# Patient Record
Sex: FEMALE | Race: WHITE | NOT HISPANIC OR LATINO | Employment: OTHER | URBAN - METROPOLITAN AREA
[De-identification: names, ages, dates, MRNs, and addresses within clinical notes are randomized per-mention and may not be internally consistent; named-entity substitution may affect disease eponyms.]

---

## 2017-01-01 ENCOUNTER — COMMUNICATION - HEALTHEAST (OUTPATIENT)
Dept: INTERNAL MEDICINE | Facility: CLINIC | Age: 67
End: 2017-01-01

## 2017-01-01 DIAGNOSIS — I10 HYPERTENSION: ICD-10-CM

## 2017-01-09 ENCOUNTER — RECORDS - HEALTHEAST (OUTPATIENT)
Dept: ADMINISTRATIVE | Facility: OTHER | Age: 67
End: 2017-01-09

## 2017-01-24 ENCOUNTER — RECORDS - HEALTHEAST (OUTPATIENT)
Dept: ADMINISTRATIVE | Facility: OTHER | Age: 67
End: 2017-01-24

## 2017-03-31 ENCOUNTER — COMMUNICATION - HEALTHEAST (OUTPATIENT)
Dept: INTERNAL MEDICINE | Facility: CLINIC | Age: 67
End: 2017-03-31

## 2017-03-31 DIAGNOSIS — I10 HYPERTENSION: ICD-10-CM

## 2017-05-01 ENCOUNTER — COMMUNICATION - HEALTHEAST (OUTPATIENT)
Dept: INTERNAL MEDICINE | Facility: CLINIC | Age: 67
End: 2017-05-01

## 2017-06-05 ENCOUNTER — OFFICE VISIT - HEALTHEAST (OUTPATIENT)
Dept: INTERNAL MEDICINE | Facility: CLINIC | Age: 67
End: 2017-06-05

## 2017-06-05 DIAGNOSIS — E83.119 HEMOCHROMATOSIS: ICD-10-CM

## 2017-06-05 DIAGNOSIS — E78.5 HYPERLIPIDEMIA: ICD-10-CM

## 2017-06-05 DIAGNOSIS — I10 HYPERTENSION: ICD-10-CM

## 2017-06-05 DIAGNOSIS — E11.9 DIABETES TYPE 2, CONTROLLED (H): ICD-10-CM

## 2017-06-05 DIAGNOSIS — D75.1 POLYCYTHEMIA: ICD-10-CM

## 2017-06-05 DIAGNOSIS — R60.9 EDEMA: ICD-10-CM

## 2017-06-05 DIAGNOSIS — S83.209A TORN MENISCUS: ICD-10-CM

## 2017-06-05 ASSESSMENT — MIFFLIN-ST. JEOR: SCORE: 1310.42

## 2017-06-06 ENCOUNTER — COMMUNICATION - HEALTHEAST (OUTPATIENT)
Dept: INTERNAL MEDICINE | Facility: CLINIC | Age: 67
End: 2017-06-06

## 2017-06-06 LAB — CHOLEST SERPL-MCNC: 150 MG/DL

## 2017-06-10 ENCOUNTER — COMMUNICATION - HEALTHEAST (OUTPATIENT)
Dept: INTERNAL MEDICINE | Facility: CLINIC | Age: 67
End: 2017-06-10

## 2017-06-27 ENCOUNTER — RECORDS - HEALTHEAST (OUTPATIENT)
Dept: ADMINISTRATIVE | Facility: OTHER | Age: 67
End: 2017-06-27

## 2017-07-12 ENCOUNTER — OFFICE VISIT - HEALTHEAST (OUTPATIENT)
Dept: FAMILY MEDICINE | Facility: CLINIC | Age: 67
End: 2017-07-12

## 2017-07-12 DIAGNOSIS — L03.011 CELLULITIS OF RIGHT MIDDLE FINGER: ICD-10-CM

## 2017-08-21 ENCOUNTER — OFFICE VISIT - HEALTHEAST (OUTPATIENT)
Dept: INTERNAL MEDICINE | Facility: CLINIC | Age: 67
End: 2017-08-21

## 2017-08-21 DIAGNOSIS — L03.011 PARONYCHIA OF FINGER, RIGHT: ICD-10-CM

## 2017-08-21 ASSESSMENT — MIFFLIN-ST. JEOR: SCORE: 1293.05

## 2017-11-03 ENCOUNTER — RECORDS - HEALTHEAST (OUTPATIENT)
Dept: ADMINISTRATIVE | Facility: OTHER | Age: 67
End: 2017-11-03

## 2017-11-28 ENCOUNTER — RECORDS - HEALTHEAST (OUTPATIENT)
Dept: ADMINISTRATIVE | Facility: OTHER | Age: 67
End: 2017-11-28

## 2018-02-08 ENCOUNTER — RECORDS - HEALTHEAST (OUTPATIENT)
Dept: ADMINISTRATIVE | Facility: OTHER | Age: 68
End: 2018-02-08

## 2018-03-15 ENCOUNTER — OFFICE VISIT - HEALTHEAST (OUTPATIENT)
Dept: INTERNAL MEDICINE | Facility: CLINIC | Age: 68
End: 2018-03-15

## 2018-03-15 ENCOUNTER — AMBULATORY - HEALTHEAST (OUTPATIENT)
Dept: INTERNAL MEDICINE | Facility: CLINIC | Age: 68
End: 2018-03-15

## 2018-03-15 DIAGNOSIS — E83.119 HEMOCHROMATOSIS: ICD-10-CM

## 2018-03-15 DIAGNOSIS — F41.9 CHRONIC ANXIETY: ICD-10-CM

## 2018-03-15 DIAGNOSIS — E11.9 DIABETES TYPE 2, CONTROLLED (H): ICD-10-CM

## 2018-03-15 DIAGNOSIS — R06.00 DYSPNEA: ICD-10-CM

## 2018-03-15 DIAGNOSIS — E78.5 HYPERLIPIDEMIA: ICD-10-CM

## 2018-03-15 DIAGNOSIS — I10 HYPERTENSION: ICD-10-CM

## 2018-03-15 LAB
ANION GAP SERPL CALCULATED.3IONS-SCNC: 15 MMOL/L (ref 5–18)
BNP SERPL-MCNC: 15 PG/ML (ref 0–117)
BUN SERPL-MCNC: 25 MG/DL (ref 8–22)
CALCIUM SERPL-MCNC: 10.3 MG/DL (ref 8.5–10.5)
CHLORIDE BLD-SCNC: 101 MMOL/L (ref 98–107)
CO2 SERPL-SCNC: 21 MMOL/L (ref 22–31)
CREAT SERPL-MCNC: 1.2 MG/DL (ref 0.6–1.1)
FERRITIN SERPL-MCNC: 63 NG/ML (ref 10–130)
GFR SERPL CREATININE-BSD FRML MDRD: 45 ML/MIN/1.73M2
GLUCOSE BLD-MCNC: 329 MG/DL (ref 70–125)
HBA1C MFR BLD: 9.7 % (ref 3.5–6)
POTASSIUM BLD-SCNC: 4.1 MMOL/L (ref 3.5–5)
SODIUM SERPL-SCNC: 137 MMOL/L (ref 136–145)
TSH SERPL DL<=0.005 MIU/L-ACNC: 1.26 UIU/ML (ref 0.3–5)

## 2018-03-15 ASSESSMENT — MIFFLIN-ST. JEOR: SCORE: 1270.19

## 2018-03-16 ENCOUNTER — COMMUNICATION - HEALTHEAST (OUTPATIENT)
Dept: INTERNAL MEDICINE | Facility: CLINIC | Age: 68
End: 2018-03-16

## 2018-03-21 ENCOUNTER — COMMUNICATION - HEALTHEAST (OUTPATIENT)
Dept: INTERNAL MEDICINE | Facility: CLINIC | Age: 68
End: 2018-03-21

## 2018-04-09 ENCOUNTER — HOSPITAL ENCOUNTER (OUTPATIENT)
Dept: NUCLEAR MEDICINE | Facility: CLINIC | Age: 68
Discharge: HOME | End: 2018-04-09
Attending: INTERNAL MEDICINE

## 2018-04-09 ENCOUNTER — COMMUNICATION - HEALTHEAST (OUTPATIENT)
Dept: INTERNAL MEDICINE | Facility: CLINIC | Age: 68
End: 2018-04-09

## 2018-04-09 ENCOUNTER — AMBULATORY - HEALTHEAST (OUTPATIENT)
Dept: INTERNAL MEDICINE | Facility: CLINIC | Age: 68
End: 2018-04-09

## 2018-04-09 ENCOUNTER — HOSPITAL ENCOUNTER (OUTPATIENT)
Dept: CARDIOLOGY | Facility: CLINIC | Age: 68
Discharge: HOME | End: 2018-04-09
Attending: INTERNAL MEDICINE

## 2018-04-09 ENCOUNTER — COMMUNICATION - HEALTHEAST (OUTPATIENT)
Dept: TELEHEALTH | Facility: CLINIC | Age: 68
End: 2018-04-09

## 2018-04-09 DIAGNOSIS — I20.9 ANGINA PECTORIS (H): ICD-10-CM

## 2018-04-09 DIAGNOSIS — R06.00 DYSPNEA: ICD-10-CM

## 2018-04-09 DIAGNOSIS — R94.39 ABNORMAL STRESS TEST: ICD-10-CM

## 2018-04-09 LAB
CV STRESS CURRENT BP HE: NORMAL
CV STRESS CURRENT HR HE: 110
CV STRESS CURRENT HR HE: 110
CV STRESS CURRENT HR HE: 111
CV STRESS CURRENT HR HE: 114
CV STRESS CURRENT HR HE: 115
CV STRESS CURRENT HR HE: 122
CV STRESS CURRENT HR HE: 123
CV STRESS CURRENT HR HE: 128
CV STRESS CURRENT HR HE: 134
CV STRESS CURRENT HR HE: 135
CV STRESS CURRENT HR HE: 137
CV STRESS CURRENT HR HE: 137
CV STRESS CURRENT HR HE: 139
CV STRESS CURRENT HR HE: 74
CV STRESS CURRENT HR HE: 74
CV STRESS CURRENT HR HE: 75
CV STRESS CURRENT HR HE: 76
CV STRESS CURRENT HR HE: 76
CV STRESS CURRENT HR HE: 78
CV STRESS CURRENT HR HE: 78
CV STRESS CURRENT HR HE: 79
CV STRESS CURRENT HR HE: 80
CV STRESS CURRENT HR HE: 87
CV STRESS CURRENT HR HE: 89
CV STRESS CURRENT HR HE: 98
CV STRESS DEVIATION TIME HE: NORMAL
CV STRESS ECHO PERCENT HR HE: NORMAL
CV STRESS EXERCISE STAGE HE: NORMAL
CV STRESS EXERCISE STAGE REACHED HE: NORMAL
CV STRESS FINAL RESTING BP HE: NORMAL
CV STRESS FINAL RESTING HR HE: 74
CV STRESS MAX HR HE: 139
CV STRESS MAX TREADMILL GRADE HE: 12
CV STRESS MAX TREADMILL SPEED HE: 2.5
CV STRESS PEAK DIA BP HE: NORMAL
CV STRESS PEAK SYS BP HE: NORMAL
CV STRESS PHASE HE: NORMAL
CV STRESS PROTOCOL HE: NORMAL
CV STRESS RESTING PT POSITION HE: NORMAL
CV STRESS RESTING PT POSITION HE: NORMAL
CV STRESS ST DEVIATION AMOUNT HE: NORMAL
CV STRESS ST DEVIATION ELEVATION HE: NORMAL
CV STRESS ST EVELATION AMOUNT HE: NORMAL
CV STRESS TEST TYPE HE: NORMAL
CV STRESS TOTAL STAGE TIME MIN 1 HE: NORMAL
NUC STRESS EJECTION FRACTION: 70 %
STRESS ECHO BASELINE BP: NORMAL
STRESS ECHO BASELINE HR: 75
STRESS ECHO CALCULATED PERCENT HR: 93 %
STRESS ECHO LAST STRESS BP: NORMAL
STRESS ECHO LAST STRESS HR: 137
STRESS ECHO POST ESTIMATED WORKLOAD: 7.1
STRESS ECHO POST EXERCISE DUR MIN: 5
STRESS ECHO POST EXERCISE DUR SEC: 59
STRESS ECHO TARGET HR: 128

## 2018-04-10 ENCOUNTER — COMMUNICATION - HEALTHEAST (OUTPATIENT)
Dept: INTERNAL MEDICINE | Facility: CLINIC | Age: 68
End: 2018-04-10

## 2018-04-10 DIAGNOSIS — F41.9 CHRONIC ANXIETY: ICD-10-CM

## 2018-04-17 ENCOUNTER — OFFICE VISIT - HEALTHEAST (OUTPATIENT)
Dept: CARDIOLOGY | Facility: CLINIC | Age: 68
End: 2018-04-17

## 2018-04-17 DIAGNOSIS — R06.09 DYSPNEA ON EXERTION: ICD-10-CM

## 2018-04-17 DIAGNOSIS — E11.9 DIABETES (H): ICD-10-CM

## 2018-04-17 DIAGNOSIS — R94.39 ABNORMAL NUCLEAR STRESS TEST: ICD-10-CM

## 2018-04-17 DIAGNOSIS — E78.5 DYSLIPIDEMIA, GOAL LDL BELOW 100: ICD-10-CM

## 2018-04-17 DIAGNOSIS — I10 ESSENTIAL HYPERTENSION: ICD-10-CM

## 2018-04-17 ASSESSMENT — MIFFLIN-ST. JEOR: SCORE: 1283.79

## 2018-04-26 ENCOUNTER — COMMUNICATION - HEALTHEAST (OUTPATIENT)
Dept: CARDIOLOGY | Facility: CLINIC | Age: 68
End: 2018-04-26

## 2018-05-01 ENCOUNTER — HOSPITAL ENCOUNTER (OUTPATIENT)
Dept: CT IMAGING | Facility: CLINIC | Age: 68
Discharge: HOME | End: 2018-05-01
Attending: INTERNAL MEDICINE

## 2018-05-01 DIAGNOSIS — R06.09 DYSPNEA ON EXERTION: ICD-10-CM

## 2018-05-01 DIAGNOSIS — R06.09 OTHER FORMS OF DYSPNEA: ICD-10-CM

## 2018-05-01 DIAGNOSIS — R94.39 ABNORMAL NUCLEAR STRESS TEST: ICD-10-CM

## 2018-05-01 LAB
CREAT BLD-MCNC: 0.9 MG/DL
POC GFR AMER AF HE - HISTORICAL: >60 ML/MIN/1.73M2
POC GFR NON AMER AF HE - HISTORICAL: >60 ML/MIN/1.73M2

## 2018-05-01 RX ORDER — IBUPROFEN 200 MG
200 TABLET ORAL EVERY 6 HOURS PRN
Status: SHIPPED | COMMUNITY
Start: 2018-05-01

## 2018-05-01 ASSESSMENT — MIFFLIN-ST. JEOR: SCORE: 1270.19

## 2018-05-12 ENCOUNTER — COMMUNICATION - HEALTHEAST (OUTPATIENT)
Dept: INTERNAL MEDICINE | Facility: CLINIC | Age: 68
End: 2018-05-12

## 2018-05-12 DIAGNOSIS — F41.9 CHRONIC ANXIETY: ICD-10-CM

## 2018-05-14 ENCOUNTER — RECORDS - HEALTHEAST (OUTPATIENT)
Dept: ADMINISTRATIVE | Facility: OTHER | Age: 68
End: 2018-05-14

## 2018-05-24 ENCOUNTER — OFFICE VISIT - HEALTHEAST (OUTPATIENT)
Dept: CARDIOLOGY | Facility: CLINIC | Age: 68
End: 2018-05-24

## 2018-05-24 DIAGNOSIS — E78.5 DYSLIPIDEMIA, GOAL LDL BELOW 100: ICD-10-CM

## 2018-05-24 DIAGNOSIS — I10 ESSENTIAL HYPERTENSION: ICD-10-CM

## 2018-05-24 DIAGNOSIS — I25.10 CORONARY ARTERY DISEASE INVOLVING NATIVE CORONARY ARTERY OF NATIVE HEART WITHOUT ANGINA PECTORIS: ICD-10-CM

## 2018-05-24 ASSESSMENT — MIFFLIN-ST. JEOR: SCORE: 1265.65

## 2018-06-08 ENCOUNTER — AMBULATORY - HEALTHEAST (OUTPATIENT)
Dept: CARDIOLOGY | Facility: CLINIC | Age: 68
End: 2018-06-08

## 2018-06-08 DIAGNOSIS — I25.10 CORONARY ARTERY DISEASE INVOLVING NATIVE CORONARY ARTERY OF NATIVE HEART WITHOUT ANGINA PECTORIS: ICD-10-CM

## 2018-06-08 LAB
ALBUMIN SERPL-MCNC: 4.1 G/DL (ref 3.5–5)
ALP SERPL-CCNC: 52 U/L (ref 45–120)
ALT SERPL W P-5'-P-CCNC: 18 U/L (ref 0–45)
AST SERPL W P-5'-P-CCNC: 16 U/L (ref 0–40)
BILIRUB DIRECT SERPL-MCNC: 0.2 MG/DL
BILIRUB SERPL-MCNC: 0.6 MG/DL (ref 0–1)
CHOLEST SERPL-MCNC: 152 MG/DL
FASTING STATUS PATIENT QL REPORTED: YES
HDLC SERPL-MCNC: 46 MG/DL
LDLC SERPL CALC-MCNC: 57 MG/DL
PROT SERPL-MCNC: 8 G/DL (ref 6–8)
TRIGL SERPL-MCNC: 246 MG/DL

## 2018-06-15 ENCOUNTER — COMMUNICATION - HEALTHEAST (OUTPATIENT)
Dept: CARDIOLOGY | Facility: CLINIC | Age: 68
End: 2018-06-15

## 2018-06-28 ENCOUNTER — COMMUNICATION - HEALTHEAST (OUTPATIENT)
Dept: INTERNAL MEDICINE | Facility: CLINIC | Age: 68
End: 2018-06-28

## 2018-06-28 DIAGNOSIS — F41.9 CHRONIC ANXIETY: ICD-10-CM

## 2018-06-28 DIAGNOSIS — I10 HYPERTENSION: ICD-10-CM

## 2018-07-31 ENCOUNTER — COMMUNICATION - HEALTHEAST (OUTPATIENT)
Dept: INTERNAL MEDICINE | Facility: CLINIC | Age: 68
End: 2018-07-31

## 2018-07-31 DIAGNOSIS — F41.9 CHRONIC ANXIETY: ICD-10-CM

## 2018-08-15 ENCOUNTER — COMMUNICATION - HEALTHEAST (OUTPATIENT)
Dept: INTERNAL MEDICINE | Facility: CLINIC | Age: 68
End: 2018-08-15

## 2018-08-15 DIAGNOSIS — E78.5 DYSLIPIDEMIA, GOAL LDL BELOW 100: ICD-10-CM

## 2018-08-15 DIAGNOSIS — I10 HYPERTENSION: ICD-10-CM

## 2018-08-27 ENCOUNTER — RECORDS - HEALTHEAST (OUTPATIENT)
Dept: ADMINISTRATIVE | Facility: OTHER | Age: 68
End: 2018-08-27

## 2018-09-07 ENCOUNTER — COMMUNICATION - HEALTHEAST (OUTPATIENT)
Dept: INTERNAL MEDICINE | Facility: CLINIC | Age: 68
End: 2018-09-07

## 2018-09-07 DIAGNOSIS — F41.9 CHRONIC ANXIETY: ICD-10-CM

## 2018-09-14 ENCOUNTER — RECORDS - HEALTHEAST (OUTPATIENT)
Dept: ADMINISTRATIVE | Facility: OTHER | Age: 68
End: 2018-09-14

## 2018-10-19 ENCOUNTER — COMMUNICATION - HEALTHEAST (OUTPATIENT)
Dept: INTERNAL MEDICINE | Facility: CLINIC | Age: 68
End: 2018-10-19

## 2018-10-19 DIAGNOSIS — F41.9 CHRONIC ANXIETY: ICD-10-CM

## 2018-11-14 ENCOUNTER — OFFICE VISIT - HEALTHEAST (OUTPATIENT)
Dept: CARDIOLOGY | Facility: CLINIC | Age: 68
End: 2018-11-14

## 2018-11-14 DIAGNOSIS — I25.10 CORONARY ARTERY DISEASE INVOLVING NATIVE CORONARY ARTERY OF NATIVE HEART WITHOUT ANGINA PECTORIS: ICD-10-CM

## 2018-11-14 DIAGNOSIS — E78.5 DYSLIPIDEMIA, GOAL LDL BELOW 100: ICD-10-CM

## 2018-11-14 DIAGNOSIS — I10 ESSENTIAL HYPERTENSION: ICD-10-CM

## 2018-11-14 ASSESSMENT — MIFFLIN-ST. JEOR: SCORE: 1270.19

## 2018-11-20 ENCOUNTER — COMMUNICATION - HEALTHEAST (OUTPATIENT)
Dept: INTERNAL MEDICINE | Facility: CLINIC | Age: 68
End: 2018-11-20

## 2018-11-20 DIAGNOSIS — F41.9 CHRONIC ANXIETY: ICD-10-CM

## 2018-12-23 ENCOUNTER — COMMUNICATION - HEALTHEAST (OUTPATIENT)
Dept: INTERNAL MEDICINE | Facility: CLINIC | Age: 68
End: 2018-12-23

## 2018-12-23 DIAGNOSIS — F41.9 CHRONIC ANXIETY: ICD-10-CM

## 2019-01-23 ENCOUNTER — COMMUNICATION - HEALTHEAST (OUTPATIENT)
Dept: INTERNAL MEDICINE | Facility: CLINIC | Age: 69
End: 2019-01-23

## 2019-01-23 DIAGNOSIS — F41.9 CHRONIC ANXIETY: ICD-10-CM

## 2019-01-25 ENCOUNTER — COMMUNICATION - HEALTHEAST (OUTPATIENT)
Dept: INTERNAL MEDICINE | Facility: CLINIC | Age: 69
End: 2019-01-25

## 2019-01-25 DIAGNOSIS — F41.9 CHRONIC ANXIETY: ICD-10-CM

## 2019-02-25 ENCOUNTER — COMMUNICATION - HEALTHEAST (OUTPATIENT)
Dept: INTERNAL MEDICINE | Facility: CLINIC | Age: 69
End: 2019-02-25

## 2019-02-25 DIAGNOSIS — E78.5 DYSLIPIDEMIA, GOAL LDL BELOW 100: ICD-10-CM

## 2019-02-25 DIAGNOSIS — I10 HYPERTENSION: ICD-10-CM

## 2019-02-28 ENCOUNTER — COMMUNICATION - HEALTHEAST (OUTPATIENT)
Dept: INTERNAL MEDICINE | Facility: CLINIC | Age: 69
End: 2019-02-28

## 2019-02-28 DIAGNOSIS — F41.9 CHRONIC ANXIETY: ICD-10-CM

## 2019-03-05 ENCOUNTER — COMMUNICATION - HEALTHEAST (OUTPATIENT)
Dept: INTERNAL MEDICINE | Facility: CLINIC | Age: 69
End: 2019-03-05

## 2019-03-05 DIAGNOSIS — F41.9 CHRONIC ANXIETY: ICD-10-CM

## 2019-03-15 ENCOUNTER — COMMUNICATION - HEALTHEAST (OUTPATIENT)
Dept: INTERNAL MEDICINE | Facility: CLINIC | Age: 69
End: 2019-03-15

## 2019-03-15 DIAGNOSIS — F41.9 CHRONIC ANXIETY: ICD-10-CM

## 2019-03-28 ENCOUNTER — OFFICE VISIT - HEALTHEAST (OUTPATIENT)
Dept: INTERNAL MEDICINE | Facility: CLINIC | Age: 69
End: 2019-03-28

## 2019-03-28 DIAGNOSIS — Z12.11 SCREEN FOR COLON CANCER: ICD-10-CM

## 2019-03-28 DIAGNOSIS — E83.110 HEREDITARY HEMOCHROMATOSIS (H): ICD-10-CM

## 2019-03-28 DIAGNOSIS — I25.10 CORONARY ARTERY DISEASE INVOLVING NATIVE CORONARY ARTERY OF NATIVE HEART WITHOUT ANGINA PECTORIS: ICD-10-CM

## 2019-03-28 DIAGNOSIS — E11.9 TYPE 2 DIABETES MELLITUS WITHOUT COMPLICATION, WITHOUT LONG-TERM CURRENT USE OF INSULIN (H): ICD-10-CM

## 2019-03-28 DIAGNOSIS — F41.9 CHRONIC ANXIETY: ICD-10-CM

## 2019-03-28 DIAGNOSIS — Z12.31 VISIT FOR SCREENING MAMMOGRAM: ICD-10-CM

## 2019-03-28 LAB
ALBUMIN SERPL-MCNC: 4.6 G/DL (ref 3.5–5)
ALBUMIN UR-MCNC: NEGATIVE MG/DL
ALP SERPL-CCNC: 50 U/L (ref 45–120)
ALT SERPL W P-5'-P-CCNC: 20 U/L (ref 0–45)
ANION GAP SERPL CALCULATED.3IONS-SCNC: 12 MMOL/L (ref 5–18)
APPEARANCE UR: CLEAR
AST SERPL W P-5'-P-CCNC: 12 U/L (ref 0–40)
BACTERIA #/AREA URNS HPF: ABNORMAL HPF
BASOPHILS # BLD AUTO: 0.1 THOU/UL (ref 0–0.2)
BASOPHILS NFR BLD AUTO: 1 % (ref 0–2)
BILIRUB DIRECT SERPL-MCNC: 0.2 MG/DL
BILIRUB SERPL-MCNC: 0.7 MG/DL (ref 0–1)
BILIRUB UR QL STRIP: NEGATIVE
BUN SERPL-MCNC: 24 MG/DL (ref 8–28)
CALCIUM SERPL-MCNC: 10.5 MG/DL (ref 8.5–10.5)
CHLORIDE BLD-SCNC: 102 MMOL/L (ref 98–107)
CO2 SERPL-SCNC: 26 MMOL/L (ref 22–31)
COLOR UR AUTO: YELLOW
CREAT SERPL-MCNC: 1.11 MG/DL (ref 0.6–1.1)
CREAT UR-MCNC: 89.5 MG/DL
EOSINOPHIL # BLD AUTO: 0.2 THOU/UL (ref 0–0.4)
EOSINOPHIL NFR BLD AUTO: 3 % (ref 0–6)
ERYTHROCYTE [DISTWIDTH] IN BLOOD BY AUTOMATED COUNT: 11.5 % (ref 11–14.5)
ERYTHROCYTE [SEDIMENTATION RATE] IN BLOOD BY WESTERGREN METHOD: 8 MM/HR (ref 0–20)
FERRITIN SERPL-MCNC: 48 NG/ML (ref 10–130)
GFR SERPL CREATININE-BSD FRML MDRD: 48 ML/MIN/1.73M2
GLUCOSE BLD-MCNC: 292 MG/DL (ref 70–125)
GLUCOSE UR STRIP-MCNC: ABNORMAL MG/DL
HCT VFR BLD AUTO: 44.3 % (ref 35–47)
HGB BLD-MCNC: 15.6 G/DL (ref 12–16)
HGB UR QL STRIP: NEGATIVE
IRON SATN MFR SERPL: 46 % (ref 20–50)
IRON SERPL-MCNC: 159 UG/DL (ref 42–175)
KETONES UR STRIP-MCNC: ABNORMAL MG/DL
LEUKOCYTE ESTERASE UR QL STRIP: ABNORMAL
LYMPHOCYTES # BLD AUTO: 2.2 THOU/UL (ref 0.8–4.4)
LYMPHOCYTES NFR BLD AUTO: 37 % (ref 20–40)
MCH RBC QN AUTO: 33.1 PG (ref 27–34)
MCHC RBC AUTO-ENTMCNC: 35.2 G/DL (ref 32–36)
MCV RBC AUTO: 94 FL (ref 80–100)
MICROALBUMIN UR-MCNC: 1.26 MG/DL (ref 0–1.99)
MICROALBUMIN/CREAT UR: 14.1 MG/G
MONOCYTES # BLD AUTO: 0.5 THOU/UL (ref 0–0.9)
MONOCYTES NFR BLD AUTO: 8 % (ref 2–10)
NEUTROPHILS # BLD AUTO: 3.1 THOU/UL (ref 2–7.7)
NEUTROPHILS NFR BLD AUTO: 52 % (ref 50–70)
NITRATE UR QL: NEGATIVE
PH UR STRIP: 5.5 [PH] (ref 5–8)
PLATELET # BLD AUTO: 236 THOU/UL (ref 140–440)
PMV BLD AUTO: 8.5 FL (ref 7–10)
POTASSIUM BLD-SCNC: 4.5 MMOL/L (ref 3.5–5)
PROT SERPL-MCNC: 7.9 G/DL (ref 6–8)
RBC # BLD AUTO: 4.71 MILL/UL (ref 3.8–5.4)
RBC #/AREA URNS AUTO: ABNORMAL HPF
SODIUM SERPL-SCNC: 140 MMOL/L (ref 136–145)
SP GR UR STRIP: 1.02 (ref 1–1.03)
SQUAMOUS #/AREA URNS AUTO: ABNORMAL LPF
TIBC SERPL-MCNC: 342 UG/DL (ref 313–563)
TRANSFERRIN SERPL-MCNC: 273 MG/DL (ref 212–360)
TSH SERPL DL<=0.005 MIU/L-ACNC: 1.18 UIU/ML (ref 0.3–5)
UROBILINOGEN UR STRIP-ACNC: ABNORMAL
WBC #/AREA URNS AUTO: ABNORMAL HPF
WBC: 5.9 THOU/UL (ref 4–11)

## 2019-03-28 ASSESSMENT — MIFFLIN-ST. JEOR: SCORE: 1274.9

## 2019-03-29 ENCOUNTER — AMBULATORY - HEALTHEAST (OUTPATIENT)
Dept: INTERNAL MEDICINE | Facility: CLINIC | Age: 69
End: 2019-03-29

## 2019-03-29 ENCOUNTER — COMMUNICATION - HEALTHEAST (OUTPATIENT)
Dept: INTERNAL MEDICINE | Facility: CLINIC | Age: 69
End: 2019-03-29

## 2019-03-29 DIAGNOSIS — E11.8 TYPE 2 DIABETES MELLITUS WITH COMPLICATION, WITHOUT LONG-TERM CURRENT USE OF INSULIN (H): ICD-10-CM

## 2019-03-29 LAB — BACTERIA SPEC CULT: NO GROWTH

## 2019-04-05 ENCOUNTER — COMMUNICATION - HEALTHEAST (OUTPATIENT)
Dept: INTERNAL MEDICINE | Facility: CLINIC | Age: 69
End: 2019-04-05

## 2019-04-05 DIAGNOSIS — I10 HYPERTENSION: ICD-10-CM

## 2019-05-23 ENCOUNTER — RECORDS - HEALTHEAST (OUTPATIENT)
Dept: ADMINISTRATIVE | Facility: OTHER | Age: 69
End: 2019-05-23

## 2019-06-10 ENCOUNTER — RECORDS - HEALTHEAST (OUTPATIENT)
Dept: ADMINISTRATIVE | Facility: OTHER | Age: 69
End: 2019-06-10

## 2019-06-14 ENCOUNTER — RECORDS - HEALTHEAST (OUTPATIENT)
Dept: ADMINISTRATIVE | Facility: OTHER | Age: 69
End: 2019-06-14

## 2019-06-21 ENCOUNTER — RECORDS - HEALTHEAST (OUTPATIENT)
Dept: HEALTH INFORMATION MANAGEMENT | Facility: CLINIC | Age: 69
End: 2019-06-21

## 2019-08-26 ENCOUNTER — RECORDS - HEALTHEAST (OUTPATIENT)
Dept: ADMINISTRATIVE | Facility: OTHER | Age: 69
End: 2019-08-26

## 2019-10-01 ENCOUNTER — COMMUNICATION - HEALTHEAST (OUTPATIENT)
Dept: INTERNAL MEDICINE | Facility: CLINIC | Age: 69
End: 2019-10-01

## 2019-10-01 DIAGNOSIS — F41.9 CHRONIC ANXIETY: ICD-10-CM

## 2019-10-07 ENCOUNTER — RECORDS - HEALTHEAST (OUTPATIENT)
Dept: ADMINISTRATIVE | Facility: OTHER | Age: 69
End: 2019-10-07

## 2019-10-11 ENCOUNTER — COMMUNICATION - HEALTHEAST (OUTPATIENT)
Dept: INTERNAL MEDICINE | Facility: CLINIC | Age: 69
End: 2019-10-11

## 2019-10-28 ENCOUNTER — OFFICE VISIT - HEALTHEAST (OUTPATIENT)
Dept: INTERNAL MEDICINE | Facility: CLINIC | Age: 69
End: 2019-10-28

## 2019-10-28 ENCOUNTER — RECORDS - HEALTHEAST (OUTPATIENT)
Dept: ADMINISTRATIVE | Facility: OTHER | Age: 69
End: 2019-10-28

## 2019-10-28 DIAGNOSIS — M54.9 BACK PAIN: ICD-10-CM

## 2019-10-28 DIAGNOSIS — Z01.818 PREOPERATIVE EXAMINATION: ICD-10-CM

## 2019-10-28 DIAGNOSIS — N18.30 CKD (CHRONIC KIDNEY DISEASE) STAGE 3, GFR 30-59 ML/MIN (H): ICD-10-CM

## 2019-10-28 LAB
ANION GAP SERPL CALCULATED.3IONS-SCNC: 10 MMOL/L (ref 5–18)
BUN SERPL-MCNC: 21 MG/DL (ref 8–28)
CALCIUM SERPL-MCNC: 10 MG/DL (ref 8.5–10.5)
CHLORIDE BLD-SCNC: 101 MMOL/L (ref 98–107)
CO2 SERPL-SCNC: 27 MMOL/L (ref 22–31)
CREAT SERPL-MCNC: 1.03 MG/DL (ref 0.6–1.1)
GFR SERPL CREATININE-BSD FRML MDRD: 53 ML/MIN/1.73M2
GLUCOSE BLD-MCNC: 276 MG/DL (ref 70–125)
POTASSIUM BLD-SCNC: 4.5 MMOL/L (ref 3.5–5)
SODIUM SERPL-SCNC: 138 MMOL/L (ref 136–145)

## 2019-10-29 ENCOUNTER — COMMUNICATION - HEALTHEAST (OUTPATIENT)
Dept: INTERNAL MEDICINE | Facility: CLINIC | Age: 69
End: 2019-10-29

## 2019-11-04 ENCOUNTER — COMMUNICATION - HEALTHEAST (OUTPATIENT)
Dept: INTERNAL MEDICINE | Facility: CLINIC | Age: 69
End: 2019-11-04

## 2019-11-04 ENCOUNTER — RECORDS - HEALTHEAST (OUTPATIENT)
Dept: INTERNAL MEDICINE | Facility: CLINIC | Age: 69
End: 2019-11-04

## 2019-11-08 ENCOUNTER — RECORDS - HEALTHEAST (OUTPATIENT)
Dept: ADMINISTRATIVE | Facility: OTHER | Age: 69
End: 2019-11-08

## 2019-11-25 ENCOUNTER — RECORDS - HEALTHEAST (OUTPATIENT)
Dept: ADMINISTRATIVE | Facility: OTHER | Age: 69
End: 2019-11-25

## 2019-12-11 ENCOUNTER — RECORDS - HEALTHEAST (OUTPATIENT)
Dept: ADMINISTRATIVE | Facility: OTHER | Age: 69
End: 2019-12-11

## 2020-01-06 ENCOUNTER — OFFICE VISIT - HEALTHEAST (OUTPATIENT)
Dept: INTERNAL MEDICINE | Facility: CLINIC | Age: 70
End: 2020-01-06

## 2020-01-06 DIAGNOSIS — M54.50 LUMBAR BACK PAIN: ICD-10-CM

## 2020-01-06 DIAGNOSIS — Z78.0 POSTMENOPAUSAL: ICD-10-CM

## 2020-01-08 ENCOUNTER — COMMUNICATION - HEALTHEAST (OUTPATIENT)
Dept: INTERNAL MEDICINE | Facility: CLINIC | Age: 70
End: 2020-01-08

## 2020-01-08 DIAGNOSIS — I10 ESSENTIAL HYPERTENSION: ICD-10-CM

## 2020-01-09 ENCOUNTER — RECORDS - HEALTHEAST (OUTPATIENT)
Dept: ADMINISTRATIVE | Facility: OTHER | Age: 70
End: 2020-01-09

## 2020-01-27 ENCOUNTER — RECORDS - HEALTHEAST (OUTPATIENT)
Dept: ADMINISTRATIVE | Facility: OTHER | Age: 70
End: 2020-01-27

## 2020-01-27 ENCOUNTER — RECORDS - HEALTHEAST (OUTPATIENT)
Dept: BONE DENSITY | Facility: CLINIC | Age: 70
End: 2020-01-27

## 2020-01-27 DIAGNOSIS — Z78.0 ASYMPTOMATIC MENOPAUSAL STATE: ICD-10-CM

## 2020-01-30 ENCOUNTER — COMMUNICATION - HEALTHEAST (OUTPATIENT)
Dept: INTERNAL MEDICINE | Facility: CLINIC | Age: 70
End: 2020-01-30

## 2020-02-07 ENCOUNTER — COMMUNICATION - HEALTHEAST (OUTPATIENT)
Dept: INTERNAL MEDICINE | Facility: CLINIC | Age: 70
End: 2020-02-07

## 2020-02-11 ENCOUNTER — OFFICE VISIT - HEALTHEAST (OUTPATIENT)
Dept: INTERNAL MEDICINE | Facility: CLINIC | Age: 70
End: 2020-02-11

## 2020-02-11 DIAGNOSIS — E83.119 HEMOCHROMATOSIS, UNSPECIFIED HEMOCHROMATOSIS TYPE: ICD-10-CM

## 2020-02-11 DIAGNOSIS — H25.9 SENILE CATARACT OF LEFT EYE, UNSPECIFIED AGE-RELATED CATARACT TYPE: ICD-10-CM

## 2020-02-11 DIAGNOSIS — Z01.818 PREOP GENERAL PHYSICAL EXAM: ICD-10-CM

## 2020-02-11 DIAGNOSIS — E11.9 TYPE 2 DIABETES MELLITUS WITHOUT COMPLICATION, WITHOUT LONG-TERM CURRENT USE OF INSULIN (H): ICD-10-CM

## 2020-02-11 DIAGNOSIS — Z78.0 POSTMENOPAUSAL: ICD-10-CM

## 2020-02-11 DIAGNOSIS — E78.5 DYSLIPIDEMIA, GOAL LDL BELOW 100: ICD-10-CM

## 2020-02-11 DIAGNOSIS — I25.10 CORONARY ARTERY DISEASE INVOLVING NATIVE CORONARY ARTERY OF NATIVE HEART WITHOUT ANGINA PECTORIS: ICD-10-CM

## 2020-02-11 DIAGNOSIS — I10 ESSENTIAL HYPERTENSION: ICD-10-CM

## 2020-02-11 DIAGNOSIS — E88.810 METABOLIC SYNDROME: ICD-10-CM

## 2020-02-11 LAB
ALBUMIN SERPL-MCNC: 4.3 G/DL (ref 3.5–5)
ALP SERPL-CCNC: 46 U/L (ref 45–120)
ALT SERPL W P-5'-P-CCNC: 20 U/L (ref 0–45)
ANION GAP SERPL CALCULATED.3IONS-SCNC: 11 MMOL/L (ref 5–18)
AST SERPL W P-5'-P-CCNC: 13 U/L (ref 0–40)
BILIRUB SERPL-MCNC: 0.8 MG/DL (ref 0–1)
BUN SERPL-MCNC: 21 MG/DL (ref 8–28)
CALCIUM SERPL-MCNC: 10.1 MG/DL (ref 8.5–10.5)
CHLORIDE BLD-SCNC: 101 MMOL/L (ref 98–107)
CO2 SERPL-SCNC: 28 MMOL/L (ref 22–31)
CREAT SERPL-MCNC: 1.09 MG/DL (ref 0.6–1.1)
ERYTHROCYTE [DISTWIDTH] IN BLOOD BY AUTOMATED COUNT: 11.7 % (ref 11–14.5)
FERRITIN SERPL-MCNC: 47 NG/ML (ref 10–130)
GFR SERPL CREATININE-BSD FRML MDRD: 49 ML/MIN/1.73M2
GLUCOSE BLD-MCNC: 165 MG/DL (ref 70–125)
HCT VFR BLD AUTO: 44.3 % (ref 35–47)
HGB BLD-MCNC: 14.7 G/DL (ref 12–16)
MCH RBC QN AUTO: 33.1 PG (ref 27–34)
MCHC RBC AUTO-ENTMCNC: 33.3 G/DL (ref 32–36)
MCV RBC AUTO: 99 FL (ref 80–100)
PLATELET # BLD AUTO: 258 THOU/UL (ref 140–440)
PMV BLD AUTO: 7.8 FL (ref 7–10)
POTASSIUM BLD-SCNC: 4.6 MMOL/L (ref 3.5–5)
PROT SERPL-MCNC: 7.1 G/DL (ref 6–8)
RBC # BLD AUTO: 4.46 MILL/UL (ref 3.8–5.4)
SODIUM SERPL-SCNC: 140 MMOL/L (ref 136–145)
WBC: 6.4 THOU/UL (ref 4–11)

## 2020-02-11 RX ORDER — PIOGLITAZONEHYDROCHLORIDE 15 MG/1
15 TABLET ORAL DAILY
Status: SHIPPED | COMMUNITY
Start: 2020-02-04

## 2020-02-12 ENCOUNTER — COMMUNICATION - HEALTHEAST (OUTPATIENT)
Dept: INTERNAL MEDICINE | Facility: CLINIC | Age: 70
End: 2020-02-12

## 2020-02-24 ENCOUNTER — COMMUNICATION - HEALTHEAST (OUTPATIENT)
Dept: INTERNAL MEDICINE | Facility: CLINIC | Age: 70
End: 2020-02-24

## 2020-02-24 DIAGNOSIS — E78.5 DYSLIPIDEMIA, GOAL LDL BELOW 100: ICD-10-CM

## 2020-02-24 DIAGNOSIS — I10 HYPERTENSION: ICD-10-CM

## 2020-04-30 ENCOUNTER — RECORDS - HEALTHEAST (OUTPATIENT)
Dept: ADMINISTRATIVE | Facility: OTHER | Age: 70
End: 2020-04-30

## 2020-05-01 ENCOUNTER — COMMUNICATION - HEALTHEAST (OUTPATIENT)
Dept: INTERNAL MEDICINE | Facility: CLINIC | Age: 70
End: 2020-05-01

## 2020-05-01 DIAGNOSIS — F41.9 CHRONIC ANXIETY: ICD-10-CM

## 2020-05-18 ENCOUNTER — COMMUNICATION - HEALTHEAST (OUTPATIENT)
Dept: INTERNAL MEDICINE | Facility: CLINIC | Age: 70
End: 2020-05-18

## 2020-05-18 DIAGNOSIS — E78.5 DYSLIPIDEMIA, GOAL LDL BELOW 100: ICD-10-CM

## 2020-06-01 ENCOUNTER — RECORDS - HEALTHEAST (OUTPATIENT)
Dept: ADMINISTRATIVE | Facility: OTHER | Age: 70
End: 2020-06-01

## 2020-06-04 ENCOUNTER — COMMUNICATION - HEALTHEAST (OUTPATIENT)
Dept: INTERNAL MEDICINE | Facility: CLINIC | Age: 70
End: 2020-06-04

## 2020-06-04 DIAGNOSIS — I10 HYPERTENSION: ICD-10-CM

## 2020-06-30 ENCOUNTER — COMMUNICATION - HEALTHEAST (OUTPATIENT)
Dept: SCHEDULING | Facility: CLINIC | Age: 70
End: 2020-06-30

## 2020-06-30 ENCOUNTER — OFFICE VISIT - HEALTHEAST (OUTPATIENT)
Dept: INTERNAL MEDICINE | Facility: CLINIC | Age: 70
End: 2020-06-30

## 2020-06-30 DIAGNOSIS — S91.001A OPEN WOUND OF RIGHT KNEE, LEG, AND ANKLE, INITIAL ENCOUNTER: ICD-10-CM

## 2020-06-30 DIAGNOSIS — S81.801A OPEN WOUND OF RIGHT KNEE, LEG, AND ANKLE, INITIAL ENCOUNTER: ICD-10-CM

## 2020-06-30 DIAGNOSIS — E11.9 TYPE 2 DIABETES MELLITUS WITHOUT COMPLICATION, WITHOUT LONG-TERM CURRENT USE OF INSULIN (H): ICD-10-CM

## 2020-06-30 DIAGNOSIS — S81.001A OPEN WOUND OF RIGHT KNEE, LEG, AND ANKLE, INITIAL ENCOUNTER: ICD-10-CM

## 2020-06-30 DIAGNOSIS — I10 ESSENTIAL HYPERTENSION: ICD-10-CM

## 2020-07-14 ENCOUNTER — OFFICE VISIT - HEALTHEAST (OUTPATIENT)
Dept: INTERNAL MEDICINE | Facility: CLINIC | Age: 70
End: 2020-07-14

## 2020-07-14 DIAGNOSIS — M17.0 PRIMARY OSTEOARTHRITIS OF BOTH KNEES: ICD-10-CM

## 2020-07-14 DIAGNOSIS — R25.2 MUSCLE CRAMPS: ICD-10-CM

## 2020-07-14 DIAGNOSIS — Z12.31 VISIT FOR SCREENING MAMMOGRAM: ICD-10-CM

## 2020-07-14 DIAGNOSIS — Z78.0 POSTMENOPAUSAL: ICD-10-CM

## 2020-07-14 DIAGNOSIS — I10 ESSENTIAL HYPERTENSION: ICD-10-CM

## 2020-07-14 DIAGNOSIS — E11.9 TYPE 2 DIABETES MELLITUS WITHOUT COMPLICATION, WITHOUT LONG-TERM CURRENT USE OF INSULIN (H): ICD-10-CM

## 2020-07-14 DIAGNOSIS — Z00.00 ROUTINE GENERAL MEDICAL EXAMINATION AT A HEALTH CARE FACILITY: ICD-10-CM

## 2020-07-14 DIAGNOSIS — I25.10 CORONARY ARTERY DISEASE INVOLVING NATIVE CORONARY ARTERY OF NATIVE HEART WITHOUT ANGINA PECTORIS: ICD-10-CM

## 2020-07-14 DIAGNOSIS — M54.12 CERVICAL RADICULOPATHY: ICD-10-CM

## 2020-07-14 DIAGNOSIS — I10 HYPERTENSION: ICD-10-CM

## 2020-07-14 DIAGNOSIS — Z12.11 SCREEN FOR COLON CANCER: ICD-10-CM

## 2020-07-14 LAB
ANION GAP SERPL CALCULATED.3IONS-SCNC: 11 MMOL/L (ref 5–18)
BUN SERPL-MCNC: 21 MG/DL (ref 8–28)
CALCIUM SERPL-MCNC: 9.7 MG/DL (ref 8.5–10.5)
CHLORIDE BLD-SCNC: 101 MMOL/L (ref 98–107)
CO2 SERPL-SCNC: 27 MMOL/L (ref 22–31)
CREAT SERPL-MCNC: 0.96 MG/DL (ref 0.6–1.1)
GFR SERPL CREATININE-BSD FRML MDRD: 57 ML/MIN/1.73M2
GLUCOSE BLD-MCNC: 175 MG/DL (ref 70–125)
MAGNESIUM SERPL-MCNC: 1.5 MG/DL (ref 1.8–2.6)
POTASSIUM BLD-SCNC: 4.2 MMOL/L (ref 3.5–5)
SODIUM SERPL-SCNC: 139 MMOL/L (ref 136–145)

## 2020-07-14 ASSESSMENT — MIFFLIN-ST. JEOR: SCORE: 1265.08

## 2020-07-15 ENCOUNTER — AMBULATORY - HEALTHEAST (OUTPATIENT)
Dept: INTERNAL MEDICINE | Facility: CLINIC | Age: 70
End: 2020-07-15

## 2020-07-15 ENCOUNTER — COMMUNICATION - HEALTHEAST (OUTPATIENT)
Dept: INTERNAL MEDICINE | Facility: CLINIC | Age: 70
End: 2020-07-15

## 2020-07-15 DIAGNOSIS — E83.42 HYPOMAGNESEMIA: ICD-10-CM

## 2020-07-15 RX ORDER — MAGNESIUM OXIDE 400 MG/1
400 TABLET ORAL DAILY
Qty: 200 TABLET | Refills: 3 | Status: SHIPPED | OUTPATIENT
Start: 2020-07-15

## 2020-08-29 ENCOUNTER — COMMUNICATION - HEALTHEAST (OUTPATIENT)
Dept: INTERNAL MEDICINE | Facility: CLINIC | Age: 70
End: 2020-08-29

## 2020-08-29 DIAGNOSIS — F41.9 CHRONIC ANXIETY: ICD-10-CM

## 2020-08-29 DIAGNOSIS — G25.81 RESTLESS LEGS SYNDROME (RLS): ICD-10-CM

## 2020-09-15 ENCOUNTER — RECORDS - HEALTHEAST (OUTPATIENT)
Dept: ADMINISTRATIVE | Facility: OTHER | Age: 70
End: 2020-09-15

## 2020-10-21 ENCOUNTER — HOSPITAL ENCOUNTER (OUTPATIENT)
Dept: MAMMOGRAPHY | Facility: CLINIC | Age: 70
Discharge: HOME | End: 2020-10-21
Attending: INTERNAL MEDICINE

## 2020-10-21 DIAGNOSIS — Z12.31 VISIT FOR SCREENING MAMMOGRAM: ICD-10-CM

## 2020-10-22 ENCOUNTER — RECORDS - HEALTHEAST (OUTPATIENT)
Dept: RADIOLOGY | Facility: CLINIC | Age: 70
End: 2020-10-22

## 2020-10-22 ENCOUNTER — RECORDS - HEALTHEAST (OUTPATIENT)
Dept: ADMINISTRATIVE | Facility: OTHER | Age: 70
End: 2020-10-22

## 2020-10-23 ENCOUNTER — RECORDS - HEALTHEAST (OUTPATIENT)
Dept: ADMINISTRATIVE | Facility: OTHER | Age: 70
End: 2020-10-23

## 2020-10-23 LAB — RETINOPATHY: NEGATIVE

## 2020-10-25 ENCOUNTER — COMMUNICATION - HEALTHEAST (OUTPATIENT)
Dept: INTERNAL MEDICINE | Facility: CLINIC | Age: 70
End: 2020-10-25

## 2020-10-25 DIAGNOSIS — G25.81 RESTLESS LEGS SYNDROME (RLS): ICD-10-CM

## 2020-10-28 ENCOUNTER — RECORDS - HEALTHEAST (OUTPATIENT)
Dept: HEALTH INFORMATION MANAGEMENT | Facility: CLINIC | Age: 70
End: 2020-10-28

## 2020-12-18 ENCOUNTER — COMMUNICATION - HEALTHEAST (OUTPATIENT)
Dept: INTERNAL MEDICINE | Facility: CLINIC | Age: 70
End: 2020-12-18

## 2020-12-18 DIAGNOSIS — G25.81 RESTLESS LEGS SYNDROME (RLS): ICD-10-CM

## 2020-12-21 ENCOUNTER — RECORDS - HEALTHEAST (OUTPATIENT)
Dept: ADMINISTRATIVE | Facility: OTHER | Age: 70
End: 2020-12-21

## 2021-01-15 ENCOUNTER — COMMUNICATION - HEALTHEAST (OUTPATIENT)
Dept: INTERNAL MEDICINE | Facility: CLINIC | Age: 71
End: 2021-01-15

## 2021-01-15 DIAGNOSIS — I10 ESSENTIAL HYPERTENSION: ICD-10-CM

## 2021-01-16 RX ORDER — LOSARTAN POTASSIUM 100 MG/1
100 TABLET ORAL DAILY
Qty: 90 TABLET | Refills: 1 | Status: SHIPPED | OUTPATIENT
Start: 2021-01-16 | End: 2021-07-23

## 2021-01-18 ENCOUNTER — COMMUNICATION - HEALTHEAST (OUTPATIENT)
Dept: ADMINISTRATIVE | Facility: CLINIC | Age: 71
End: 2021-01-18

## 2021-01-20 ENCOUNTER — COMMUNICATION - HEALTHEAST (OUTPATIENT)
Dept: INTERNAL MEDICINE | Facility: CLINIC | Age: 71
End: 2021-01-20

## 2021-01-20 DIAGNOSIS — I10 ESSENTIAL HYPERTENSION: ICD-10-CM

## 2021-01-21 RX ORDER — HYDROCHLOROTHIAZIDE 25 MG/1
TABLET ORAL
Qty: 90 TABLET | Refills: 1 | Status: SHIPPED | OUTPATIENT
Start: 2021-01-21 | End: 2021-07-26

## 2021-02-04 ENCOUNTER — COMMUNICATION - HEALTHEAST (OUTPATIENT)
Dept: INTERNAL MEDICINE | Facility: CLINIC | Age: 71
End: 2021-02-04

## 2021-02-04 DIAGNOSIS — G25.81 RESTLESS LEGS SYNDROME (RLS): ICD-10-CM

## 2021-02-17 ENCOUNTER — COMMUNICATION - HEALTHEAST (OUTPATIENT)
Dept: INTERNAL MEDICINE | Facility: CLINIC | Age: 71
End: 2021-02-17

## 2021-02-17 DIAGNOSIS — E78.5 DYSLIPIDEMIA, GOAL LDL BELOW 100: ICD-10-CM

## 2021-03-09 ENCOUNTER — OFFICE VISIT - HEALTHEAST (OUTPATIENT)
Dept: INTERNAL MEDICINE | Facility: CLINIC | Age: 71
End: 2021-03-09

## 2021-03-09 DIAGNOSIS — I10 ESSENTIAL HYPERTENSION: ICD-10-CM

## 2021-03-09 DIAGNOSIS — G25.81 RESTLESS LEGS SYNDROME (RLS): ICD-10-CM

## 2021-03-09 DIAGNOSIS — I25.10 CORONARY ARTERY DISEASE INVOLVING NATIVE CORONARY ARTERY OF NATIVE HEART WITHOUT ANGINA PECTORIS: ICD-10-CM

## 2021-03-09 DIAGNOSIS — M17.0 PRIMARY OSTEOARTHRITIS OF BOTH KNEES: ICD-10-CM

## 2021-03-09 DIAGNOSIS — E78.5 DYSLIPIDEMIA, GOAL LDL BELOW 100: ICD-10-CM

## 2021-03-09 DIAGNOSIS — E88.810 METABOLIC SYNDROME: ICD-10-CM

## 2021-03-09 DIAGNOSIS — Z01.818 PREOP GENERAL PHYSICAL EXAM: ICD-10-CM

## 2021-03-09 DIAGNOSIS — E11.9 TYPE 2 DIABETES MELLITUS WITHOUT COMPLICATION, WITHOUT LONG-TERM CURRENT USE OF INSULIN (H): ICD-10-CM

## 2021-03-09 LAB
ANION GAP SERPL CALCULATED.3IONS-SCNC: 10 MMOL/L (ref 5–18)
BASOPHILS # BLD AUTO: 0.1 THOU/UL (ref 0–0.2)
BASOPHILS NFR BLD AUTO: 1 % (ref 0–2)
BUN SERPL-MCNC: 25 MG/DL (ref 8–28)
CALCIUM SERPL-MCNC: 9.8 MG/DL (ref 8.5–10.5)
CHLORIDE BLD-SCNC: 101 MMOL/L (ref 98–107)
CO2 SERPL-SCNC: 28 MMOL/L (ref 22–31)
CREAT SERPL-MCNC: 0.95 MG/DL (ref 0.6–1.1)
EOSINOPHIL # BLD AUTO: 0.2 THOU/UL (ref 0–0.4)
EOSINOPHIL NFR BLD AUTO: 2 % (ref 0–6)
ERYTHROCYTE [DISTWIDTH] IN BLOOD BY AUTOMATED COUNT: 11.9 % (ref 11–14.5)
GFR SERPL CREATININE-BSD FRML MDRD: 58 ML/MIN/1.73M2
GLUCOSE BLD-MCNC: 143 MG/DL (ref 70–125)
HBA1C MFR BLD: 7.8 %
HCT VFR BLD AUTO: 41.6 % (ref 35–47)
HGB BLD-MCNC: 14.6 G/DL (ref 12–16)
IMM GRANULOCYTES # BLD: 0 THOU/UL
IMM GRANULOCYTES NFR BLD: 0 %
LYMPHOCYTES # BLD AUTO: 2.5 THOU/UL (ref 0.8–4.4)
LYMPHOCYTES NFR BLD AUTO: 40 % (ref 20–40)
MCH RBC QN AUTO: 33.6 PG (ref 27–34)
MCHC RBC AUTO-ENTMCNC: 35.1 G/DL (ref 32–36)
MCV RBC AUTO: 96 FL (ref 80–100)
MONOCYTES # BLD AUTO: 0.6 THOU/UL (ref 0–0.9)
MONOCYTES NFR BLD AUTO: 9 % (ref 2–10)
NEUTROPHILS # BLD AUTO: 3 THOU/UL (ref 2–7.7)
NEUTROPHILS NFR BLD AUTO: 48 % (ref 50–70)
PLATELET # BLD AUTO: 290 THOU/UL (ref 140–440)
PMV BLD AUTO: 10.1 FL (ref 7–10)
POTASSIUM BLD-SCNC: 4.5 MMOL/L (ref 3.5–5)
RBC # BLD AUTO: 4.35 MILL/UL (ref 3.8–5.4)
SODIUM SERPL-SCNC: 139 MMOL/L (ref 136–145)
WBC: 6.3 THOU/UL (ref 4–11)

## 2021-03-09 RX ORDER — PREDNISOLONE ACETATE 10 MG/ML
SUSPENSION/ DROPS OPHTHALMIC
Status: SHIPPED | COMMUNITY
Start: 2021-03-09

## 2021-03-10 ENCOUNTER — COMMUNICATION - HEALTHEAST (OUTPATIENT)
Dept: INTERNAL MEDICINE | Facility: CLINIC | Age: 71
End: 2021-03-10

## 2021-03-10 LAB
ATRIAL RATE - MUSE: 68 BPM
DIASTOLIC BLOOD PRESSURE - MUSE: NORMAL
INTERPRETATION ECG - MUSE: NORMAL
P AXIS - MUSE: 41 DEGREES
PR INTERVAL - MUSE: 136 MS
QRS DURATION - MUSE: 72 MS
QT - MUSE: 414 MS
QTC - MUSE: 440 MS
R AXIS - MUSE: 35 DEGREES
SYSTOLIC BLOOD PRESSURE - MUSE: NORMAL
T AXIS - MUSE: 26 DEGREES
VENTRICULAR RATE- MUSE: 68 BPM

## 2021-03-25 ENCOUNTER — RECORDS - HEALTHEAST (OUTPATIENT)
Dept: ADMINISTRATIVE | Facility: OTHER | Age: 71
End: 2021-03-25
Payer: MEDICARE

## 2021-04-13 ENCOUNTER — RECORDS - HEALTHEAST (OUTPATIENT)
Dept: ADMINISTRATIVE | Facility: OTHER | Age: 71
End: 2021-04-13

## 2021-05-20 ENCOUNTER — COMMUNICATION - HEALTHEAST (OUTPATIENT)
Dept: INTERNAL MEDICINE | Facility: CLINIC | Age: 71
End: 2021-05-20

## 2021-05-20 DIAGNOSIS — G25.81 RESTLESS LEGS SYNDROME (RLS): ICD-10-CM

## 2021-05-24 ENCOUNTER — COMMUNICATION - HEALTHEAST (OUTPATIENT)
Dept: INTERNAL MEDICINE | Facility: CLINIC | Age: 71
End: 2021-05-24

## 2021-05-24 DIAGNOSIS — E78.5 DYSLIPIDEMIA, GOAL LDL BELOW 100: ICD-10-CM

## 2021-05-24 DIAGNOSIS — G25.81 RESTLESS LEGS SYNDROME (RLS): ICD-10-CM

## 2021-05-24 RX ORDER — ROSUVASTATIN CALCIUM 20 MG/1
TABLET, COATED ORAL
Qty: 90 TABLET | Refills: 3 | Status: SHIPPED | OUTPATIENT
Start: 2021-05-24 | End: 2021-08-19

## 2021-05-24 RX ORDER — CLONAZEPAM 0.25 MG/1
TABLET, ORALLY DISINTEGRATING ORAL
Qty: 60 TABLET | Refills: 0 | Status: SHIPPED | OUTPATIENT
Start: 2021-05-24 | End: 2021-10-04

## 2021-05-25 ENCOUNTER — RECORDS - HEALTHEAST (OUTPATIENT)
Dept: ADMINISTRATIVE | Facility: OTHER | Age: 71
End: 2021-05-25

## 2021-05-26 ENCOUNTER — RECORDS - HEALTHEAST (OUTPATIENT)
Dept: ADMINISTRATIVE | Facility: CLINIC | Age: 71
End: 2021-05-26

## 2021-05-26 VITALS — DIASTOLIC BLOOD PRESSURE: 65 MMHG | HEART RATE: 76 BPM | SYSTOLIC BLOOD PRESSURE: 104 MMHG

## 2021-05-27 NOTE — TELEPHONE ENCOUNTER
Refill Approved    Rx renewed per Medication Renewal Policy. Medication was last renewed on 6/28/18.    Tena Camacho, [facility] Connection Triage/Med Refill 4/6/2019     Requested Prescriptions   Pending Prescriptions Disp Refills     losartan-hydrochlorothiazide (HYZAAR) 100-25 mg per tablet [Pharmacy Med Name: LOSARTAN/HCTZ 100-25MG TAB] 90 tablet 2     Sig: TAKE 1 TABLET BY MOUTH ONCE DAILY    Diuretics/Combination Diuretics Refill Protocol  Passed - 4/5/2019 11:00 AM       Passed - Visit with PCP or prescribing provider visit in past 12 months    Last office visit with prescriber/PCP: 3/28/2019 Chago Bernal MD OR same dept: 3/28/2019 Cahgo Bernal MD OR same specialty: 3/28/2019 Chago Bernal MD  Last physical: Visit date not found Last MTM visit: Visit date not found   Next visit within 3 mo: Visit date not found  Next physical within 3 mo: Visit date not found  Prescriber OR PCP: Chago Bernal MD  Last diagnosis associated with med order: 1. Hypertension  - losartan-hydrochlorothiazide (HYZAAR) 100-25 mg per tablet [Pharmacy Med Name: LOSARTAN/HCTZ 100-25MG TAB]; TAKE 1 TABLET BY MOUTH ONCE DAILY  Dispense: 90 tablet; Refill: 2    If protocol passes may refill for 12 months if within 3 months of last provider visit (or a total of 15 months).            Passed - Serum Potassium in past 12 months     Lab Results   Component Value Date    Potassium 4.5 03/28/2019            Passed - Serum Sodium in past 12 months     Lab Results   Component Value Date    Sodium 140 03/28/2019            Passed - Blood pressure on file in past 12 months    BP Readings from Last 1 Encounters:   03/28/19 126/70            Passed - Serum Creatinine in past 12 months     Creatinine   Date Value Ref Range Status   03/28/2019 1.11 (H) 0.60 - 1.10 mg/dL Final

## 2021-05-27 NOTE — PROGRESS NOTES
OFFICE VISIT NOTE  Yumiko Peña   69 y.o. female             Assessment/Plan for  Yumiko Peña is a 69 y.o. female.  No Patient Care Coordination Note on file.       1. Chronic anxiety  Well controlled on clonazepam  Doing well  Contract signed  Follow-up 6 months  - clonazePAM (KLONOPIN) 0.25 MG disintegrating tablet; Take 1 tablet (0.25 mg total) by mouth 2 (two) times a day. 1-2 daily as needed  Dispense: 180 tablet; Refill: 1    2. Type 2 diabetes mellitus without complication, without long-term current use of insulin (H)  Followed by Dr. Lowery.  Will check labs today.  She has visit in a couple weeks  - Basic Metabolic Panel  - HM1(CBC and Differential)  - Erythrocyte Sedimentation Rate  - Urinalysis-UC if Indicated  - Thyroid Cascade  - Hepatic Profile  - HM1 (CBC with Diff)  - Microalbumin, Random Urine    3. Hereditary hemochromatosis (H)  Check CBC iron  liver  - Iron and Transferrin Iron Binding Capacity  - Ferritin    4. Coronary artery disease involving native coronary artery of native heart without angina pectoris  Mild to moderate on CTA.  Asymptomatic    5. Visit for screening mammogram       6. Screen for colon cancer          Plan:  Med refill  Laboratory  Follow-up 6 months    There are no Patient Instructions on file for this visit.    Diagnoses and all orders for this visit:    Type 2 diabetes mellitus without complication, without long-term current use of insulin (H)  -     Basic Metabolic Panel  -     HM1(CBC and Differential)  -     Erythrocyte Sedimentation Rate  -     Urinalysis-UC if Indicated  -     Thyroid Cascade  -     Hepatic Profile  -     HM1 (CBC with Diff)  -     Microalbumin, Random Urine  -     Cancel: Urinalysis    Chronic anxiety  -     clonazePAM (KLONOPIN) 0.25 MG disintegrating tablet  Dispense: 180 tablet; Refill: 1    Hereditary hemochromatosis (H)  -     Iron and Transferrin Iron Binding Capacity  -     Ferritin    Coronary artery disease involving native  coronary artery of native heart without angina pectoris    Visit for screening mammogram    Screen for colon cancer        Medications after visit  Current Outpatient Medications   Medication Sig Dispense Refill     ascorbic acid (ASCORBIC ACID WITH JACQUES HIPS) 500 MG tablet Take 500 mg by mouth daily as needed .             aspirin 81 MG EC tablet Take 1 tablet (81 mg total) by mouth daily. 30 tablet 6     BIOTIN ORAL Take 2,500 mcg by mouth daily .             CALCIUM-MAGNESIUM-ZINC ORAL Take 1 tablet by mouth daily .             clonazePAM (KLONOPIN) 0.25 MG disintegrating tablet Take 1 tablet (0.25 mg total) by mouth 2 (two) times a day. 1-2 daily as needed 180 tablet 1     fluticasone (FLONASE) 50 mcg/actuation nasal spray 1 spray into each nostril 2 (two) times a day as needed.       GLIMEPIRIDE ORAL Take 4 mg by mouth daily .             ibuprofen (ADVIL,MOTRIN) 200 MG tablet Take 200 mg by mouth every 6 (six) hours as needed for pain.       losartan-hydrochlorothiazide (HYZAAR) 100-25 mg per tablet TAKE ONE TABLET BY MOUTH ONCE DAILY 90 tablet 2     METFORMIN HCL (METFORMIN ORAL) Take 2,000 mg by mouth at bedtime .             potassium chloride (KLOR-CON) 10 MEQ CR tablet TAKE 1 TABLET BY MOUTH ONCE DAILY 90 tablet 3     rosuvastatin (CRESTOR) 20 MG tablet TAKE 1 TABLET BY MOUTH ONCE DAILY IN THE EVENING 90 tablet 3     No current facility-administered medications for this visit.                       Chago Bernal MD  Internal medicine  [facility]  [phone]  [email or web address]    Much or all of the text in this note was generated through the use of Dragon Dictate voice-to-text software. Errors in spelling or words which seem out of context are unintentional.   Sound alike errors, in particular, may have escaped editing.                 Subjective:   Chief Complaint:  Hemochromatosis; Fatigue (Seems worse); Follow-up; Medication Questions (Discuss clonazepam); and  Medication Refill    Follow-up    Nonspecific fatigue  Anxiety well controlled with 0.25 clonazepam twice daily as needed    Nonobstructive coronary disease  Asymptomatic    Diabetes mellitus-followed by endocrinology.  On oral glimepiride  Sugars have been up a bit recently    Hyperlipoproteinemia-patient is tolerating medication.  There are no myalgia, arthralgia, weakness.  No Bowel issues.  Liver profile has been normal.  Patient has met cholesterol goals.    History of hemochromatosis with phlebotomy.  Urination unremarkable.  Color.  No abdominal pain  Review of Systems:     Extensive 10-point review of systems was performed. Please see the HPI for problem specific pertinent review of systems.     Patient does note he feels well    She sees her gynecologist yearly  She sees Dr. Lowery regularly    Otherwise, the following systems are noncontributory including constitutional, eyes, ears, nose and throat, cardiovascular, respiratory, gastrointestinal, genitourinary, musculoskeletal,neurological, skin and/or breast, endocrine, hematologic/lymph, allergic/immunologic and psychiatric.              Medications:  Current Outpatient Medications on File Prior to Visit   Medication Sig     ascorbic acid (ASCORBIC ACID WITH JACQUES HIPS) 500 MG tablet Take 500 mg by mouth daily as needed .           aspirin 81 MG EC tablet Take 1 tablet (81 mg total) by mouth daily.     BIOTIN ORAL Take 2,500 mcg by mouth daily .           CALCIUM-MAGNESIUM-ZINC ORAL Take 1 tablet by mouth daily .           fluticasone (FLONASE) 50 mcg/actuation nasal spray 1 spray into each nostril 2 (two) times a day as needed.     GLIMEPIRIDE ORAL Take 4 mg by mouth daily .           ibuprofen (ADVIL,MOTRIN) 200 MG tablet Take 200 mg by mouth every 6 (six) hours as needed for pain.     losartan-hydrochlorothiazide (HYZAAR) 100-25 mg per tablet TAKE ONE TABLET BY MOUTH ONCE DAILY     METFORMIN HCL (METFORMIN ORAL) Take 2,000 mg by mouth at bedtime .        "    potassium chloride (KLOR-CON) 10 MEQ CR tablet TAKE 1 TABLET BY MOUTH ONCE DAILY     rosuvastatin (CRESTOR) 20 MG tablet TAKE 1 TABLET BY MOUTH ONCE DAILY IN THE EVENING     [DISCONTINUED] clonazePAM (KLONOPIN) 0.25 MG disintegrating tablet DISSOLVE 1 TABLET IN MOUTH TWICE DAILY AS NEEDED FOR ANXIETY     [DISCONTINUED] clotrimazole (LOTRIMIN) 1 % cream Apply topically 2 (two) times a day.     [DISCONTINUED] rosuvastatin (CRESTOR) 20 MG tablet Take 1 tablet (20 mg total) by mouth every evening.     No current facility-administered medications on file prior to visit.             Allergies:  Allergies   Allergen Reactions     Lipitor [Atorvastatin] Myalgia       PSFHx: Tobacco Status:  She  reports that she quit smoking about 23 years ago. she has never used smokeless tobacco.   Alcohol Status:    Social History     Substance and Sexual Activity   Alcohol Use No       reports that she quit smoking about 23 years ago. she has never used smokeless tobacco. She reports that she does not drink alcohol or use drugs.    Objective:    /70 (Patient Site: Right Arm, Patient Position: Sitting, Cuff Size: Adult Regular)   Pulse 80   Ht 5' 4\" (1.626 m)   Wt 173 lb 0.6 oz (78.5 kg)   LMP  (LMP Unknown)   SpO2 96%   Breastfeeding? No   BMI 29.70 kg/m    Weight:   Wt Readings from Last 3 Encounters:   03/28/19 173 lb 0.6 oz (78.5 kg)   11/14/18 172 lb (78 kg)   05/24/18 171 lb (77.6 kg)     BP Readings from Last 10 Encounters:   03/28/19 126/70   11/14/18 102/68   05/24/18 106/60   05/01/18 107/52   04/17/18 110/70   03/15/18 120/70   08/21/17 120/66   07/12/17 120/74   06/05/17 112/64   12/16/15 118/64         General-appears well, no acute distress.  Skin: Normal. No rash or lesion  Head:  Normocephalic, symmetric  Speech-clear  Eyes: Eyes midline full EOM.  External exams normal.  No icterus  Neck:  No palpable masses, lymphadenopathy or tenderness. No thyromegaly or goiter  Carotid Arteries:  Equal pulsations " bilateral.No Bruit, normal upstroke  Chest Wall: No deformity or pain elicited on compression.  Respiratory:  Normal respiratory effort.  Lungs are clear with good breath sounds.  No dullness.  No wheezing.  Heart: Regular rhythm.  Normal sounding S1, S2 without S3, S4, murmurs, rubs, or gallops.  Extremities-no edema good pulses  Benign abdomen without hepatomegaly.         Review of clinical lab tests  Lab Results   Component Value Date    WBC 5.9 03/28/2019    HGB 15.6 03/28/2019    HCT 44.3 03/28/2019     03/28/2019    CHOL 152 06/08/2018    TRIG 246 (H) 06/08/2018    HDL 46 (L) 06/08/2018    ALT 18 06/08/2018    AST 16 06/08/2018     03/15/2018    K 4.1 03/15/2018     03/15/2018    CREATININE 1.20 (H) 03/15/2018    BUN 25 (H) 03/15/2018    CO2 21 (L) 03/15/2018    TSH 1.26 03/15/2018    HGBA1C 9.7 (H) 03/15/2018       Glucose   Date/Time Value Ref Range Status   03/15/2018 11:06  (H) 70 - 125 mg/dL Final   06/05/2017 03:53  (H) 70 - 125 mg/dL Final   08/08/2011 11:25  (H) 70 - 125 mg/dL Final     Comment:          Fasting Glucose reference range is 70-99 mg/dL per       American Diabetes Association (ADA) guidelines.     Recent Results (from the past 24 hour(s))   Erythrocyte Sedimentation Rate   Result Value Ref Range    Sed Rate 8 0 - 20 mm/hr   HM1 (CBC with Diff)   Result Value Ref Range    WBC 5.9 4.0 - 11.0 thou/uL    RBC 4.71 3.80 - 5.40 mill/uL    Hemoglobin 15.6 12.0 - 16.0 g/dL    Hematocrit 44.3 35.0 - 47.0 %    MCV 94 80 - 100 fL    MCH 33.1 27.0 - 34.0 pg    MCHC 35.2 32.0 - 36.0 g/dL    RDW 11.5 11.0 - 14.5 %    Platelets 236 140 - 440 thou/uL    MPV 8.5 7.0 - 10.0 fL    Neutrophils % 52 50 - 70 %    Lymphocytes % 37 20 - 40 %    Monocytes % 8 2 - 10 %    Eosinophils % 3 0 - 6 %    Basophils % 1 0 - 2 %    Neutrophils Absolute 3.1 2.0 - 7.7 thou/uL    Lymphocytes Absolute 2.2 0.8 - 4.4 thou/uL    Monocytes Absolute 0.5 0.0 - 0.9 thou/uL    Eosinophils Absolute  0.2 0.0 - 0.4 thou/uL    Basophils Absolute 0.1 0.0 - 0.2 thou/uL       RADIOLOGY: No results found.

## 2021-05-31 VITALS — HEIGHT: 64 IN | BODY MASS INDEX: 30.22 KG/M2 | WEIGHT: 177.04 LBS

## 2021-05-31 VITALS — WEIGHT: 178 LBS | BODY MASS INDEX: 30.08 KG/M2

## 2021-05-31 VITALS — BODY MASS INDEX: 29.84 KG/M2 | WEIGHT: 179.12 LBS | HEIGHT: 65 IN

## 2021-06-01 VITALS — WEIGHT: 172 LBS | BODY MASS INDEX: 29.37 KG/M2 | HEIGHT: 64 IN

## 2021-06-01 VITALS — WEIGHT: 171 LBS | HEIGHT: 64 IN | BODY MASS INDEX: 29.19 KG/M2

## 2021-06-01 VITALS — BODY MASS INDEX: 29.37 KG/M2 | WEIGHT: 172 LBS | HEIGHT: 64 IN

## 2021-06-01 VITALS — BODY MASS INDEX: 29.88 KG/M2 | WEIGHT: 175 LBS | HEIGHT: 64 IN

## 2021-06-01 NOTE — TELEPHONE ENCOUNTER
Prescription Monitoring Program activity reviewed with no discrepancies noted.  Last fill per : 7/29/19    Controlled Substance Agreement on file: No  Date: NA    Last office visit with provider:  3/28/2019 Chago Bernal MD    Please advise.

## 2021-06-02 ENCOUNTER — RECORDS - HEALTHEAST (OUTPATIENT)
Dept: ADMINISTRATIVE | Facility: CLINIC | Age: 71
End: 2021-06-02

## 2021-06-02 VITALS — WEIGHT: 172 LBS | HEIGHT: 64 IN | BODY MASS INDEX: 29.37 KG/M2

## 2021-06-02 VITALS — WEIGHT: 173.04 LBS | HEIGHT: 64 IN | BODY MASS INDEX: 29.54 KG/M2

## 2021-06-02 NOTE — TELEPHONE ENCOUNTER
Reason contacted:  Results  Information relayed:  See below message from Dr Adams  Additional questions:  No  Further follow-up needed:  No  Okay to leave a detailed message:  No     Left detailed message on voicemail.

## 2021-06-02 NOTE — TELEPHONE ENCOUNTER
----- Message from Jamie Adams MD sent at 10/29/2019  8:13 AM CDT -----  Could you please call patient to let her know that her kidney function is slightly improved compared to 7 months ago, and that her electrolytes are stable. Thank you

## 2021-06-02 NOTE — TELEPHONE ENCOUNTER
New Appointment Needed  What is the reason for the visit:    Pre-Op Appt Request  When is the surgery? :  11/6 and 11/20  Where is the surgery?:   [facility]  Who is the surgeon? :  Dr. Farrell  What type of surgery is being done?: Cateract  Provider Preference: PCP only  How soon do you need to be seen?: See below  Waitlist offered?: No  Okay to leave a detailed message:  Yes    Pre op - cateract 11/6 and 11/20 at [facility] Dr. Bud Farrell told patient to have pre-op after 10/22. Patient will be available October 23-25 and Monday 10/28. And Tuesday 11/5.

## 2021-06-02 NOTE — TELEPHONE ENCOUNTER
Dr. Bernal,  Did you want to try and fit the patient on your schedule somewhere, or should we schedule with another provider?  Please advise.  Thank you.  [place] NATARAJAN, DORETHA/SHARONDA....................4:26 PM

## 2021-06-03 VITALS
HEART RATE: 82 BPM | BODY MASS INDEX: 29.52 KG/M2 | DIASTOLIC BLOOD PRESSURE: 68 MMHG | WEIGHT: 172 LBS | SYSTOLIC BLOOD PRESSURE: 132 MMHG | OXYGEN SATURATION: 97 %

## 2021-06-03 NOTE — TELEPHONE ENCOUNTER
Name of form/paperwork: Other:  pre op Additional form may be needed.  If so [facility] will fax to # [phone]  Have you been seen for this request: Yes:  Needs information sent to [facility].  The patient is worried as she still has the fomr to fill out for surgery at her house.  She did not bring it with her [facility] appointment. Writer faxed pre op with any labs and EKG to [facility].    Do we have the form: No  When is form needed by: Today as surgery is tomorrow.  Just the additional form that is being sent to clinic needs to be returned.   How would you like the form returned: Fax  Fax Number: [facility] [phone]  Patient Notified form requests are processed in 3-5 business days: No  (If patient needs form sooner, please note that in this message.)  Okay to leave a detailed message? No

## 2021-06-03 NOTE — TELEPHONE ENCOUNTER
This was already completed when she was in for pre-op.    Juju GROVES LPN .......... 9:42 AM  11/04/19

## 2021-06-04 VITALS
HEART RATE: 78 BPM | WEIGHT: 172.4 LBS | SYSTOLIC BLOOD PRESSURE: 107 MMHG | TEMPERATURE: 97.9 F | DIASTOLIC BLOOD PRESSURE: 63 MMHG | BODY MASS INDEX: 29.59 KG/M2 | OXYGEN SATURATION: 97 %

## 2021-06-04 VITALS
WEIGHT: 170 LBS | OXYGEN SATURATION: 96 % | BODY MASS INDEX: 29.02 KG/M2 | TEMPERATURE: 98.7 F | HEIGHT: 64 IN | HEART RATE: 68 BPM | SYSTOLIC BLOOD PRESSURE: 112 MMHG | DIASTOLIC BLOOD PRESSURE: 70 MMHG

## 2021-06-04 VITALS
BODY MASS INDEX: 29.54 KG/M2 | DIASTOLIC BLOOD PRESSURE: 62 MMHG | SYSTOLIC BLOOD PRESSURE: 124 MMHG | WEIGHT: 172.08 LBS | OXYGEN SATURATION: 98 % | HEART RATE: 69 BPM

## 2021-06-05 VITALS
DIASTOLIC BLOOD PRESSURE: 60 MMHG | WEIGHT: 176.6 LBS | OXYGEN SATURATION: 97 % | HEART RATE: 72 BPM | BODY MASS INDEX: 30.08 KG/M2 | SYSTOLIC BLOOD PRESSURE: 100 MMHG | TEMPERATURE: 98.1 F

## 2021-06-05 NOTE — PROGRESS NOTES
[facility] Clinic Note  Yumiko Peña   69 y.o. female    Date of Visit: 1/6/2020  Chief Complaint   Patient presents with     Hip Pain     RT side - since October Visit     Back Pain       Assessment/Plan  1. Lumbar back pain  Likely MSK. Overdue for DEXA scan. Exam and history not consistent for malignancy/mass lesion, radiculopathy or compression fracture. May have some DDD. Counseled to continue to take APAP until results return  - XR Lumbar Spine 2 or 3 VWS    2. Postmenopausal  - DXA Bone Density Scan; Future       Much or all of the text in this note was generated through the use of Dragon Dictate voice-to-text software. Errors in spelling or words which seem out of context are unintentional. Sound alike errors, in particular, may have escaped editing  Jamie Adams MD    No follow-ups on file.    Subjective  This 69 y.o. old female who presents to follow up regarding some low back pain, which she originally complained about in late October when she presented for a pre-op visit. Not much has changed symptomatically.   In the lower back, mainly on the right. Ranges from 2-5/10. Slightly better since last visit. No shooting pain down her leg(s), no unintentional weight weight loss, leg weakness, rash, dysuria, hematuria, stool/bladder incontinence. Endorses periodic back spasms. Did fall roughly 1 month ago, after slipping and hitting her head and back on the car running board. No headache. No FHx of osteopenia but 6 years overdue to a DEXA scan. Pain managed with APAP, but most recently did trial Advil. No chronic history of steroid use    ROS A comprehensive review of systems was performed and was otherwise negative    Medications, allergies, and problem list were reviewed and updated    Exam  General appearance: Pleasant, nontoxic-appearing, no acute distress, alert and oriented x4  Vitals:    01/06/20 1102   BP: 124/62   Pulse: 69   SpO2: 98%   RESPIRATORY: Bilaterally with  no crackles, wheezing or rhonchi  CARDIOVASCULAR: Regular S1 and S2.  Radial pulses intact.  No edema.  GASTROINTESTINAL: NABS, soft, nontender, nondistended. No flank/CVA tenderness  MUSCULOSKELETAL: no discomfort with deep palpation of paraspinal lumbar muscles, or spinous process. Negative straight leg raise bilaterally, but did have some tightness in the bilateral hamstrings and the right IT band.  SKIN/HAIR/NAILS: Skin color was normal.  No rash on back  NEUROLOGIC: Alert and oriented to person, place, time, and circumstance. Speech was normal. Motor strength was normal for age     Additional Information   Current Outpatient Medications   Medication Sig Dispense Refill     ascorbic acid (ASCORBIC ACID WITH JACQUES HIPS) 500 MG tablet Take 500 mg by mouth daily as needed .             aspirin 81 MG EC tablet Take 1 tablet (81 mg total) by mouth daily. 30 tablet 6     BIOTIN ORAL Take 2,500 mcg by mouth daily .             CALCIUM-MAGNESIUM-ZINC ORAL Take 1 tablet by mouth daily .             clonazePAM (KLONOPIN) 0.25 MG disintegrating tablet DISSOLVE 1 TABLET IN MOUTH TWICE DAILY AS NEEDED 60 tablet 5     fluticasone (FLONASE) 50 mcg/actuation nasal spray 1 spray into each nostril 2 (two) times a day as needed.       GLIMEPIRIDE ORAL Take 4 mg by mouth daily .             ibuprofen (ADVIL,MOTRIN) 200 MG tablet Take 200 mg by mouth every 6 (six) hours as needed for pain.       losartan-hydrochlorothiazide (HYZAAR) 100-25 mg per tablet Take 1 tablet by mouth daily. 90 tablet 3     METFORMIN HCL (METFORMIN ORAL) Take 2,000 mg by mouth at bedtime .             potassium chloride (KLOR-CON) 10 MEQ CR tablet TAKE 1 TABLET BY MOUTH ONCE DAILY 90 tablet 3     rosuvastatin (CRESTOR) 20 MG tablet TAKE 1 TABLET BY MOUTH ONCE DAILY IN THE EVENING 90 tablet 3     No current facility-administered medications for this visit.      Allergies   Allergen Reactions     Lipitor [Atorvastatin] Myalgia     Social History     Social  History Narrative    Children  3    Dog [employer]    [employer]    [employer]     Social History     Tobacco Use     Smoking status: Former Smoker     Last attempt to quit: 1995     Years since quittin.0     Smokeless tobacco: Never Used   Substance Use Topics     Alcohol use: No     Drug use: No     Family History   Problem Relation Age of Onset     Diabetes Mother      Hyperlipidemia Mother      Cancer Father         abdominal     Hypertension Sister      Hyperlipidemia Sister      Hemochromatosis Unknown

## 2021-06-05 NOTE — TELEPHONE ENCOUNTER
Drug Change Request  Who is calling?:  Pharmacy   What is the current medication?:   losartan-hydrochlorothiazide (HYZAAR) 100-25 mg per tablet  What alternative is being requested?:   1. Losartan  2. hydrochlorothiazide  Why the request to change?:    Combination is on back order . Please send in 2 parts - Patient is out of medication .   Requested Pharmacy?: [facility], [place]   Okay to leave a detailed message?:  Yes

## 2021-06-05 NOTE — TELEPHONE ENCOUNTER
Spoke with the patient and helped her to schedule a pre-op appointment with Dr. Piña at the [facility].  She had no further questions at this time.  Cheyenne NATARAJAN CMA/SHARONDA....................2:28 PM

## 2021-06-05 NOTE — TELEPHONE ENCOUNTER
New Appointment Needed  What is the reason for the visit:    Pre-Op Appt Request  When is the surgery? :  2/19/20  Where is the surgery?:  [facility] in [place]  Who is the surgeon? :  Dr. Elliott  What type of surgery is being done?: Left eye cataract removal  Provider Preference: Any available  How soon do you need to be seen?: By February 12, 2020  Waitlist offered?: No  Okay to leave a detailed message:  Yes

## 2021-06-06 NOTE — PROGRESS NOTES
Preoperative Exam    Scheduled Procedure: Left Cataract  Surgery Date:  2/19/2020  Surgery Location: [facility], [place], fax [phone]    Surgeon:  Dr. Elliott    Assessment/Plan:     Satisfactory preoperative medical exam for planned left cataract surgery scheduled for February 19, 2019 at [facility] by Dr. Elliott.    For preoperative testing, organ to get a comprehensive metabolic panel (will include liver chemistry tests, because of history of hemochromatosis), blood cell counts, ferritin level.  We do not need an EKG, since she has no cardiac symptoms, and given the nature of cataract surgery.    LATER: Metabolic panel has mildly reduced GFR (estimated 49) and glucose of 165, otherwise satisfactory.  CBC normal.  Ferritin within normal limits.  Results are at the bottom of this document    As far as her medications, she told me that she takes her she can take the glimepiride with supper the evening before surgery.  I told her to go ahead and take the metformin and pioglitazone per her usual routine at bedtime.    She told me that she probably takes her blood pressure medications in the morning.  If her surgery is early in the morning, then she can take her blood pressure medications after her surgery.    Going issue by issue:    Type 2 diabetes, currently on 3 oral medications of metformin, glimepiride, and pioglitazone, with suboptimally controlled A1c and elevated morning blood sugars.  She is currently working with Dr. Lowery, endocrinology at [facility].  She told me that Dr. Lowery is considering having her take an evening dose of insulin.  She also needs to reduce the size of her supper.  She does not have any history of neuropathy, retinopathy, or nephropathy.  No history of heart attack or stroke either.    Essential hypertension, in the context of diabetes, with good blood pressure control on losartan plus hydrochlorothiazide with potassium supplementation.  Blood pressure is  excellent today.    History of what was labeled as stage III chronic kidney disease, however her creatinine level was normal last time it was checked.    Hyperlipidemia, in the context of diabetes and hypertension, with no history of cardiovascular events, on moderate intensity rosuvastatin.    Asymptomatic coronary artery disease, with coronary calcification study performed May 1, 2018 showing mild to moderate multivessel coronary artery disease, but no severe stenoses.  She takes a baby aspirin per day, and should continue on that.    Overweight with body mass index of 29.6, needs to lose about 30 pounds.    Restless leg syndrome, takes clonazepam for that, which controls symptoms well.    Hemochromatosis, used to do phlebotomy, but has not needed to do that in about 2 years.  We will check a CBC today, also  ferritin and liver chemistry tests.  I told her to eliminate the vitamin C supplement that she had been taking, because vitamin C can potentiate the absorption of iron from the gastrointestinal tract.    Low back pain, was probably a muscle strain presenting in October 2019, has resolved.    Post menopause, satisfactory DEXA bone density scan, with actually positive T-scores performed January 27, 2020.  Probably does not need to be tested again.  Continue with calcium supplement and weightbearing exercise    Rhinitis, for which he uses nasal steroid fluticasone    Screening colonoscopy is planned for March 2020  Screening mammogram is due about now, and she is going to schedule that herself.      Surgical Procedure Risk: Low (reported cardiac risk generally < 1%)  Have you had prior anesthesia?: Yes  Have you or any family members had a previous anesthesia reaction:  No  Do you or any family members have a history of a clotting or bleeding disorder?: Yes: Mother had DVT in legs  Cardiac Risk Assessment: no increased risk for major cardiac complications    APPROVAL GIVEN to proceed with proposed procedure,  without further diagnostic evaluation    Functional Status: Independent  Patient plans to recover at home with family.     Subjective:      Yumiko Peña is a 69 y.o. female who presents for a preoperative consultation.      Scheduled Procedure: Left Cataract  Surgery Date:  2/19/2020  Surgery Location: [facility], [place], fax [phone]    Surgeon:  Dr. Elliott    Had successful right-sided cataract surgery November 2019.  Now is going to have the left done.    Postmenopausal  satisfactory DEXA bone density scan, with actually positive T-scores performed January 27, 2020.  Probably does not need to be tested again.  Continue with calcium supplement and weightbearing exercise    Diabetes Mother       Hyperlipidemia Mother      Cancer Father          abdominal    Hypertension Sister      Hyperlipidemia Sister      Hemochromatosis Unknown     CKD (chronic kidney disease) stage 3, GFR 30-59 ml/min (H)  On losartan-HCTZ.  SBP well-controlled     4-9-19  [facility]  [facility]    [address]    [address]    [place] [address]    [phone]   Abdoul Lowery MD       A1cs  11/03/2017 9.6 (H)   01/09/2017 7.3 (H)   06/03/2013 6.8 (H)   09/14/2012 6.7 (H)      Hemochromatosis Z83.49   After daughter was diagnosed, Ms. 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  Did phlebotomy for couple years, but not in the last 2 years or so.  Lab Results   Component Value Date    FERRITIN 47 02/11/2020       All other systems reviewed and are negative, other than those listed in the HPI.    Pertinent History  Do you have difficulty breathing or chest pain after walking up a flight of stairs: No  History of obstructive sleep apnea: No  Steroid use in the last 6 months: Yes: Cortisone injections  Frequent Aspirin/NSAID use: No  Prior Blood Transfusion: No  Prior Blood Transfusion Reaction: No  If for some reason prior to, during or after the  procedure, if it is medically indicated, would you be willing to have a blood transfusion?:  There is no transfusion refusal.    Current Outpatient Medications   Medication Sig Dispense Refill     BIOTIN ORAL Take 2,500 mcg by mouth daily .             CALCIUM-MAGNESIUM-ZINC ORAL Take 1 tablet by mouth daily .             clonazePAM (KLONOPIN) 0.25 MG disintegrating tablet DISSOLVE 1 TABLET IN MOUTH TWICE DAILY AS NEEDED 60 tablet 5     fluticasone (FLONASE) 50 mcg/actuation nasal spray 1 spray into each nostril 2 (two) times a day as needed.       GLIMEPIRIDE ORAL Take 4 mg by mouth daily .             hydroCHLOROthiazide (HYDRODIURIL) 25 MG tablet Take 1 tablet (25 mg total) by mouth daily. 90 tablet 3     ibuprofen (ADVIL,MOTRIN) 200 MG tablet Take 200 mg by mouth every 6 (six) hours as needed for pain.       losartan (COZAAR) 100 MG tablet Take 1 tablet (100 mg total) by mouth daily. 90 tablet 3     METFORMIN HCL (METFORMIN ORAL) Take 2,000 mg by mouth at bedtime .             pioglitazone (ACTOS) 15 MG tablet Take 15 mg by mouth daily.       potassium chloride (KLOR-CON) 10 MEQ CR tablet TAKE 1 TABLET BY MOUTH ONCE DAILY 90 tablet 3     rosuvastatin (CRESTOR) 20 MG tablet TAKE 1 TABLET BY MOUTH ONCE DAILY IN THE EVENING 90 tablet 3     aspirin 81 MG EC tablet Take 1 tablet (81 mg total) by mouth daily. 30 tablet 6     prednisoLONE acetate (PRED-FORTE) 1 % ophthalmic suspension INSTILL 1 DROP INTO LEFT EYE 4 TIMES DAILY START AFTER SURGERY       No current facility-administered medications for this visit.         Allergies   Allergen Reactions     Lipitor [Atorvastatin] Myalgia       Patient Active Problem List   Diagnosis     Diabetes (H)     Essential hypertension     Metabolic syndrome     Dyslipidemia, goal LDL below 100     Hemochromatosis     Postmenopausal     Abnormal nuclear stress test     Dyspnea on exertion     Coronary artery disease involving native coronary artery of native heart without angina  pectoris       Past Medical History:   Diagnosis Date     DM type 2 (diabetes mellitus, type 2) (H)      Hemochromatosis      Hyperlipidemia      Hypertension        Past Surgical History:   Procedure Laterality Date     KNEE ARTHROSCOPY       TUBAL LIGATION       vocal cord polyp         Social History     Socioeconomic History     Marital status:      Spouse name: Not on file     Number of children: Not on file     Years of education: Not on file     Highest education level: Not on file   Occupational History     Not on file   Social Needs     Financial resource strain: Not on file     Food insecurity:     Worry: Not on file     Inability: Not on file     Transportation needs:     Medical: Not on file     Non-medical: Not on file   Tobacco Use     Smoking status: Former Smoker     Last attempt to quit: 1995     Years since quittin.1     Smokeless tobacco: Never Used   Substance and Sexual Activity     Alcohol use: No     Drug use: No     Sexual activity: Not on file   Lifestyle     Physical activity:     Days per week: Not on file     Minutes per session: Not on file     Stress: Not on file   Relationships     Social connections:     Talks on phone: Not on file     Gets together: Not on file     Attends [employer] service: Not on file     Active member of club or organization: Not on file     Attends meetings of clubs or organizations: Not on file     Relationship status: Not on file     Intimate partner violence:     Fear of current or ex partner: Not on file     Emotionally abused: Not on file     Physically abused: Not on file     Forced sexual activity: Not on file   Other Topics Concern     Not on file   Social History Narrative    Children  3    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  General: Alert, in no distress  Skin: No significant lesion seen.  Eyes/nose/throat: Eyes without scleral icterus, eye movements normal, pupils equal and reactive, oropharynx clear  MSK: Neck with good ROM  Lymphatic: Neck without adenopathy or masses  Endocrine: Thyroid with no nodules to palpation  Pulm: Lungs clear to auscultation bilaterally  Cardiac: Heart with regular rate and rhythm, no murmur or gallop  GI: Abdomen soft, nontender. No palpable enlargement of liver or spleen  MSK: Extremities no tenderness or edema  Neuro: Moves all extremities, without focal weakness  Psych: Alert, normal mental status. Normal affect and speech    There are no Patient Instructions on file for this visit.    Labs:  Recent Results (from the past 48 hour(s))   HM2(CBC w/o Differential)    Collection Time: 02/11/20 11:17 AM   Result Value Ref Range    WBC 6.4 4.0 - 11.0 thou/uL    RBC 4.46 3.80 - 5.40 mill/uL    Hemoglobin 14.7 12.0 - 16.0 g/dL    Hematocrit 44.3 35.0 - 47.0 %    MCV 99 80 - 100 fL    MCH 33.1 27.0 - 34.0 pg    MCHC 33.3 32.0 - 36.0 g/dL    RDW 11.7 11.0 - 14.5 %    Platelets 258 140 - 440 thou/uL    MPV 7.8 7.0 - 10.0 fL   Ferritin    Collection Time: 02/11/20 11:17 AM   Result Value Ref Range    Ferritin 47 10 - 130 ng/mL   Comprehensive Metabolic Panel    Collection Time: 02/11/20 11:17 AM   Result Value Ref Range    Sodium 140 136 - 145 mmol/L    Potassium 4.6 3.5 - 5.0 mmol/L    Chloride 101 98 - 107 mmol/L    CO2 28 22 - 31 mmol/L    Anion Gap, Calculation 11 5 - 18 mmol/L    Glucose 165 (H) 70 - 125 mg/dL    BUN 21 8 - 28 mg/dL    Creatinine 1.09 0.60 - 1.10 mg/dL    GFR MDRD Af Amer 60 (L) >60 mL/min/1.73m2    GFR MDRD Non Af Amer 49 (L) >60 mL/min/1.73m2    Bilirubin, Total 0.8 0.0 - 1.0 mg/dL    Calcium 10.1 8.5 - 10.5 mg/dL    Protein, Total 7.1 6.0 - 8.0 g/dL    Albumin 4.3 3.5 - 5.0 g/dL    Alkaline  Phosphatase 46 45 - 120 U/L    AST 13 0 - 40 U/L    ALT 20 0 - 45 U/L      Immunization History   Administered Date(s) Administered     Influenza high dose,seasonal,PF, 65+ yrs 12/20/2019     Influenza, inj, historic,unspecified 11/12/2016, 01/09/2018, 12/20/2019     Pneumo Conj 13-V (2010&after) 11/07/2018     Pneumo Polysac 23-V 01/19/2007     Td,adult,historic,unspecified 01/01/2008     ZOSTER, LIVE 09/19/2012     Electronically signed by Jose Piña MD 02/11/20 10:26 AM

## 2021-06-06 NOTE — TELEPHONE ENCOUNTER
RN cannot approve Refill Request    RN can NOT refill this medication allergy/contraindication. Last office visit: Visit date not found Last Physical: Visit date not found Last MTM visit: Visit date not found Last visit same specialty: 1/6/2020 Jamie Adams MD.  Next visit within 3 mo: Visit date not found  Next physical within 3 mo: Visit date not found      Elli Lau, [facility] Triage/Med Refill 2/25/2020    Requested Prescriptions   Pending Prescriptions Disp Refills     potassium chloride (KLOR-CON) 10 MEQ CR tablet [Pharmacy Med Name: Potassium Chloride ER 10 MEQ Oral Tablet Extended Release] 90 tablet 0     Sig: TAKE 1 TABLET BY MOUTH ONCE DAILY       Potassium Supplements Refill Protocol Passed - 2/24/2020  1:36 PM        Passed - PCP or prescribing provider visit in past 12 months       Last office visit with prescriber/PCP: Visit date not found OR same dept: 1/6/2020 Jamie Adams MD OR same specialty: 1/6/2020 Jamie Adams MD  Last physical: Visit date not found Last MTM visit: Visit date not found   Next visit within 3 mo: Visit date not found  Next physical within 3 mo: Visit date not found  Prescriber OR PCP: Neel Laird MD  Last diagnosis associated with med order: 1. Hypertension  - potassium chloride (KLOR-CON) 10 MEQ CR tablet [Pharmacy Med Name: Potassium Chloride ER 10 MEQ Oral Tablet Extended Release]; TAKE 1 TABLET BY MOUTH ONCE DAILY  Dispense: 90 tablet; Refill: 0    2. Dyslipidemia, goal LDL below 100  - rosuvastatin (CRESTOR) 20 MG tablet [Pharmacy Med Name: Rosuvastatin Calcium 20 MG Oral Tablet]; TAKE 1 TABLET BY MOUTH ONCE DAILY IN THE EVENING  Dispense: 90 tablet; Refill: 0    If protocol passes may refill for 12 months if within 3 months of last provider visit (or a total of 15 months).             Passed - Potassium level in last 12 months     Lab Results   Component Value Date    Potassium 4.6 02/11/2020              rosuvastatin (CRESTOR) 20 MG tablet [Pharmacy Med Name: Rosuvastatin Calcium 20 MG Oral Tablet] 90 tablet 0     Sig: TAKE 1 TABLET BY MOUTH ONCE DAILY IN THE EVENING       Statins Refill Protocol (Hmg CoA Reductase Inhibitors) Passed - 2/24/2020  1:36 PM        Passed - PCP or prescribing provider visit in past 12 months      Last office visit with prescriber/PCP: Visit date not found OR same dept: 1/6/2020 Jamie Adams MD OR same specialty: 1/6/2020 Jamie Adams MD  Last physical: Visit date not found Last MTM visit: Visit date not found   Next visit within 3 mo: Visit date not found  Next physical within 3 mo: Visit date not found  Prescriber OR PCP: Neel Laird MD  Last diagnosis associated with med order: 1. Hypertension  - potassium chloride (KLOR-CON) 10 MEQ CR tablet [Pharmacy Med Name: Potassium Chloride ER 10 MEQ Oral Tablet Extended Release]; TAKE 1 TABLET BY MOUTH ONCE DAILY  Dispense: 90 tablet; Refill: 0    2. Dyslipidemia, goal LDL below 100  - rosuvastatin (CRESTOR) 20 MG tablet [Pharmacy Med Name: Rosuvastatin Calcium 20 MG Oral Tablet]; TAKE 1 TABLET BY MOUTH ONCE DAILY IN THE EVENING  Dispense: 90 tablet; Refill: 0    If protocol passes may refill for 12 months if within 3 months of last provider visit (or a total of 15 months).

## 2021-06-06 NOTE — PATIENT INSTRUCTIONS - HE
Satisfactory preoperative medical exam for planned left cataract surgery scheduled for February 19, 2019 at [facility] by Dr. Elliott.    For preoperative testing, organ to get a comprehensive metabolic panel (will include liver chemistry tests, because of history of hemochromatosis), blood cell counts, ferritin level.  We do not need an EKG, since she has no cardiac symptoms, and given the nature of cataract surgery.    As far as her medications, she told me that she takes her she can take the glimepiride with supper the evening before surgery.  I told her to go ahead and take the metformin and pioglitazone per her usual routine at bedtime.    She told me that she probably takes her blood pressure medications in the morning.  If her surgery is early in the morning, then she can take her blood pressure medications after her surgery.    Going issue by issue:    Type 2 diabetes, currently on 3 oral medications of metformin, glimepiride, and pioglitazone, with suboptimally controlled A1c and elevated morning blood sugars.  She is currently working with Dr. Lowery, endocrinology at [facility].  She told me that Dr. Lowery is considering having her take an evening dose of insulin.  She also needs to reduce the size of her supper.  She does not have any history of neuropathy, retinopathy, or nephropathy.  No history of heart attack or stroke either.    Essential hypertension, in the context of diabetes, with good blood pressure control on losartan plus hydrochlorothiazide with potassium supplementation.  Blood pressure is excellent today.    History of what was labeled as stage III chronic kidney disease, however her creatinine level was normal last time it was checked.    Hyperlipidemia, in the context of diabetes and hypertension, with no history of cardiovascular events, on moderate intensity rosuvastatin.    Asymptomatic coronary artery disease, with coronary calcification study performed May 1, 2018 showing mild  to moderate multivessel coronary artery disease, but no severe stenoses.  She takes a baby aspirin per day, and should continue on that.    Overweight with body mass index of 29.6, needs to lose about 30 pounds.    Restless leg syndrome, takes clonazepam for that, which controls symptoms well.    Hemochromatosis, used to do phlebotomy, but has not needed to do that in about 2 years.  We will check a CBC today, also  ferritin and liver chemistry tests.  I told her to eliminate the vitamin C supplement that she had been taking, because vitamin C can potentiate the absorption of iron from the gastrointestinal tract.    Low back pain, was probably a muscle strain presenting in October 2019, has resolved.    Post menopause, satisfactory DEXA bone density scan, with actually positive T-scores performed January 27, 2020.  Probably does not need to be tested again.  Continue with calcium supplement and weightbearing exercise    Rhinitis, for which he uses nasal steroid fluticasone    Screening colonoscopy is planned for March 2020  Screening mammogram is due about now, and she is going to schedule that herself.

## 2021-06-07 NOTE — TELEPHONE ENCOUNTER
Controlled Substance Refill Request  Medication Name:   Requested Prescriptions     Pending Prescriptions Disp Refills     clonazePAM (KLONOPIN) 0.25 MG disintegrating tablet [Pharmacy Med Name: clonazePAM 0.25 MG Oral Tablet Disintegrating] 60 tablet 0     Sig: DISSOLVE 1 TABLET IN MOUTH TWICE DAILY AS NEEDED     Date Last Fill: 10/2/19  Requested Pharmacy: [facility]  Submit electronically to pharmacy  Controlled Substance Agreement on file:   Encounter-Level CSA Scan Date:    There are no encounter-level csa scan date.        Last office visit:  2/11/20

## 2021-06-08 NOTE — TELEPHONE ENCOUNTER
Refill Approved    Rx renewed per Medication Renewal Policy. Medication was last renewed on 2/26/20.    Ema Cast, [facility] Triage/Med Refill 6/9/2020     Requested Prescriptions   Pending Prescriptions Disp Refills     potassium chloride (KLOR-CON) 10 MEQ CR tablet [Pharmacy Med Name: Potassium Chloride ER 10 MEQ Oral Tablet Extended Release] 90 tablet 0     Sig: Take 1 tablet by mouth once daily       Potassium Supplements Refill Protocol Passed - 6/8/2020  9:41 AM        Passed - PCP or prescribing provider visit in past 12 months       Last office visit with prescriber/PCP: 3/28/2019 Chago Bernal MD OR same dept: 1/6/2020 Jamie Adams MD OR same specialty: 1/6/2020 Jamie Adams MD  Last physical: Visit date not found Last MTM visit: Visit date not found   Next visit within 3 mo: Visit date not found  Next physical within 3 mo: Visit date not found  Prescriber OR PCP: Chago Bernal MD  Last diagnosis associated with med order: 1. Hypertension  - potassium chloride (KLOR-CON) 10 MEQ CR tablet [Pharmacy Med Name: Potassium Chloride ER 10 MEQ Oral Tablet Extended Release]; TAKE 1 TABLET BY MOUTH ONCE DAILY  Dispense: 90 tablet; Refill: 0    If protocol passes may refill for 12 months if within 3 months of last provider visit (or a total of 15 months).             Passed - Potassium level in last 12 months     Lab Results   Component Value Date    Potassium 4.6 02/11/2020

## 2021-06-08 NOTE — TELEPHONE ENCOUNTER
Refill Request  Did you contact pharmacy: Yes  Medication name:   Requested Prescriptions     Pending Prescriptions Disp Refills     rosuvastatin (CRESTOR) 20 MG tablet [Pharmacy Med Name: Rosuvastatin Calcium 20 MG Oral Tablet] 90 tablet 0     Sig: TAKE 1 TABLET BY MOUTH ONCE DAILY IN THE EVENING     Who prescribed the medication: Chago Bernal MD  Please expedite patient is leaving out of town   Requested Pharmacy: [facility]  Is patient out of medication: Yes  Patient notified refills processed in 3 business days:  yes  Okay to leave a detailed message: yes

## 2021-06-08 NOTE — TELEPHONE ENCOUNTER
Refill Approved    Rx renewed per Medication Renewal Policy. Medication was last renewed on 2/26/20.    Ema Cast, [facility] Triage/Med Refill 5/19/2020     Requested Prescriptions   Pending Prescriptions Disp Refills     rosuvastatin (CRESTOR) 20 MG tablet [Pharmacy Med Name: Rosuvastatin Calcium 20 MG Oral Tablet] 90 tablet 0     Sig: TAKE 1 TABLET BY MOUTH ONCE DAILY IN THE EVENING       Statins Refill Protocol (Hmg CoA Reductase Inhibitors) Passed - 5/19/2020 11:42 AM        Passed - PCP or prescribing provider visit in past 12 months      Last office visit with prescriber/PCP: 3/28/2019 Chago Bernal MD OR same dept: 1/6/2020 Jamie Adams MD OR same specialty: 1/6/2020 aJmie Adams MD  Last physical: Visit date not found Last MTM visit: Visit date not found   Next visit within 3 mo: Visit date not found  Next physical within 3 mo: Visit date not found  Prescriber OR PCP: Chago Bernal MD  Last diagnosis associated with med order: 1. Dyslipidemia, goal LDL below 100  - rosuvastatin (CRESTOR) 20 MG tablet [Pharmacy Med Name: Rosuvastatin Calcium 20 MG Oral Tablet]; TAKE 1 TABLET BY MOUTH ONCE DAILY IN THE EVENING  Dispense: 90 tablet; Refill: 0    If protocol passes may refill for 12 months if within 3 months of last provider visit (or a total of 15 months).

## 2021-06-09 NOTE — PROGRESS NOTES
ASSESSMENT and PLAN:    #1.  Right ankle wound.  I told her that this will likely improve on its own within the next 2 to 3 weeks.  If she has any purulence, erythema, or if she spikes fevers she should call us back right away.  Otherwise follow-up as needed.    2.  Type 2 diabetes, uncontrolled.  Will defer care to her primary care physician.    3.  Hypertension, controlled.    Problem List Items Addressed This Visit     Essential hypertension    Diabetes (H)      Other Visit Diagnoses     Open wound of right knee, leg, and ankle, initial encounter    -  Primary          There are no Patient Instructions on file for this visit.    Medications Discontinued During This Encounter   Medication Reason     prednisoLONE acetate (PRED-FORTE) 1 % ophthalmic suspension        No follow-ups on file.    CHIEF COMPLAINT:  Chief Complaint   Patient presents with     Wound Check     right ankle - red and shooting pains        HISTORY OF PRESENT ILLNESS:  Yumiko Peña is a 70 y.o. female  presenting to the clinic today for evaluation of a wound on her right ankle.  She states that about 3 weeks ago she was sitting at a table and when she changed position she cut the inside of her ankle on something.  She has had no major complications from this aside from some mild pain.  She noticed some redness around the area, however, over the the last 10 days and since [place] she wanted to come in to make sure that she did not have an infection present.  She denies any fevers and denies any purulent drainage from the wound.  She is otherwise feeling well.  She does have a history of uncontrolled diabetes with her last A1c 8.9 in November 2019.  She also has a history of well-controlled hypertension on hydrochlorothiazide and losartan.    REVIEW OF SYSTEMS:   Pertinent positives noted in HPI, remainder of ROS is negative.    MEDICATIONS:  Current Outpatient Medications    Medication Sig Dispense Refill     aspirin 81 MG EC tablet Take 1 tablet (81 mg total) by mouth daily. 30 tablet 6     BIOTIN ORAL Take 2,500 mcg by mouth daily .             CALCIUM-MAGNESIUM-ZINC ORAL Take 1 tablet by mouth daily .             clonazePAM (KLONOPIN) 0.25 MG disintegrating tablet DISSOLVE 1 TABLET IN MOUTH TWICE DAILY AS NEEDED 60 tablet 0     fluticasone (FLONASE) 50 mcg/actuation nasal spray 1 spray into each nostril 2 (two) times a day as needed.       GLIMEPIRIDE ORAL Take 4 mg by mouth daily .             hydroCHLOROthiazide (HYDRODIURIL) 25 MG tablet Take 1 tablet (25 mg total) by mouth daily. 90 tablet 3     ibuprofen (ADVIL,MOTRIN) 200 MG tablet Take 200 mg by mouth every 6 (six) hours as needed for pain.       losartan (COZAAR) 100 MG tablet Take 1 tablet (100 mg total) by mouth daily. 90 tablet 3     METFORMIN HCL (METFORMIN ORAL) Take 2,000 mg by mouth at bedtime .             pioglitazone (ACTOS) 15 MG tablet Take 15 mg by mouth daily.       potassium chloride (KLOR-CON) 10 MEQ CR tablet Take 1 tablet by mouth once daily 90 tablet 2     rosuvastatin (CRESTOR) 20 MG tablet TAKE 1 TABLET BY MOUTH ONCE DAILY IN THE EVENING 90 tablet 2     No current facility-administered medications for this visit.        TOBACCO USE:  Social History     Tobacco Use   Smoking Status Former Smoker     Last attempt to quit: 1995     Years since quittin.5   Smokeless Tobacco Never Used       VITALS:  Vitals:    20 1453   BP: 104/65   Pulse: 76     Wt Readings from Last 3 Encounters:   20 172 lb 6.4 oz (78.2 kg)   20 172 lb 1.3 oz (78.1 kg)   10/28/19 172 lb (78 kg)         PHYSICAL EXAM:  Constitutional:  Reveals an alert, pleasant female.   HEET: Normocephalic, without obvious abnormality, atraumatic. PERRL, conjunctiva/corneas clear, EOM's intact. External canals, TMs clear.   Neurologic: Normal gait and station  Psychologic: Normal affect  Skin: On the inside of the right  ankle that there is a approximately 2 x 1cm wound with no significant erythema surrounding it.  No purulent drainage from the area.  Mild tenderness to palpation.  No warmth around the area.  There is very adequate granulation tissue within it and it appears to be healing by secondary intention.

## 2021-06-09 NOTE — TELEPHONE ENCOUNTER
"Pt states \"I cut the inside of ankle bone.\"  \"Hit something sharp under a table leg.\"  \"Bled a little bit.\"  Occurred three weeks ago.    Scab appeared to be healing.  Pt has been applying topical antibiotic ointment.    However \"Still looks red around it.\"  \"Looks slightly worse today.\"  Size of redness = 1\" by 2\"    No fevers.  No suspicion of covid symptoms.  No sick household members.  No known exposures.  No travel.  Pt feels well other than ankle wound.    Pt's symptoms appear to warrant in-person clinic eval.  Pt agrees to plan.  Warm transferred to a  for this purpose now.    Shira Spivey RN  [facility] Triage  ______________________________    No suspicion of covid symptoms.  Nevertheless provided precautionary measures per current protocols:  COVID 19 Nurse Triage Plan/Patient Instructions    Please be aware that novel coronavirus (COVID-19) may be circulating in the community. If you develop symptoms such as fever, cough, or SOB or if you have concerns about the presence of another infection including coronavirus (COVID-19), please contact your health care provider or visit [email or web address].     Disposition/Instructions    Additional COVID19 information to add for patients.   How can I protect others?  If you have symptoms (fever, cough, body aches or trouble breathing): Stay home and away from others (self-isolate) until:    At least 10 days have passed since your symptoms started. And     You ve had no fever--and no medicine that reduces fever--for 3 full days (72 hours). And      Your other symptoms have resolved (gotten better).     If you don t have symptoms, but a test showed that you have COVID-19 (you tested positive):    Stay home and away from others (self-isolate) until at least 10 days have passed since the date of your first positive COVID-19 test.    During this time:    Stay in your own room, even for meals. Use your own bathroom if you can.     Stay away from others in your " home. No hugging, kissing or shaking hands. No visitors.    Don t go to work, school or anywhere else.     Clean  high touch  surfaces often (doorknobs, counters, handles, etc.). Use a household cleaning spray or wipes. You ll find a full list on the EPA website:  www.epa.gov/pesticide-registration/list-n-disinfectants-use-against-sars-cov-2.    Cover your mouth and nose with a mask, tissue or washcloth to avoid spreading germs.    Wash your hands and face often. Use soap and water.    Caregivers in these groups are at risk for severe illness due to COVID-19:  o People 65 years and older  o People who live in a nursing home or long-term care facility  o People with chronic disease (lung, heart, cancer, diabetes, kidney, liver, immunologic)  o People who have a weakened immune system, including those who:  - Are in cancer treatment  - Take medicine that weakens the immune system, such as corticosteroids  - Had a bone marrow or organ transplant  - Have an immune deficiency  - Have poorly controlled HIV or AIDS  - Are obese (body mass index of 40 or higher)  - Smoke regularly    Caregivers should wear gloves while washing dishes, handling laundry and cleaning bedrooms and bathrooms.    Use caution when washing and drying laundry: Don t shake dirty laundry, and use the warmest water setting that you can.    For more tips, go to www.cdc.gov/coronavirus/2019-ncov/downloads/10Things.pdf.    How can I take care of myself?  1. Get lots of rest. Drink extra fluids (unless a doctor has told you not to).     2. Take Tylenol (acetaminophen) for fever or pain. If you have liver or kidney problems, ask your family doctor if it s okay to take Tylenol.     Adults can take either:     650 mg (two 325 mg pills) every 4 to 6 hours, or     1,000 mg (two 500 mg pills) every 8 hours as needed.     Note: Don t take more than 3,000 mg in one day.   Acetaminophen is found in many medicines (both prescribed and over-the-counter medicines).  Read all labels to be sure you don t take too much.     For children, check the Tylenol bottle for the right dose. The dose is based on the child s age or weight.    3. If you have other health problems (like cancer, heart failure, an organ transplant or severe kidney disease): Call your specialty clinic if you don t feel better in the next 2 days.    4. Know when to call 911: Emergency warning signs include:    Trouble breathing or shortness of breath    Pain or pressure in the chest that doesn t go away    Feeling confused like you haven t felt before, or not being able to wake up    Bluish-colored lips or face    What are the symptoms of COVID-19?     The most common symptoms are cough, fever and trouble breathing.     Less common symptoms include body aches, chills, diarrhea (loose, watery poops), fatigue (feeling very tired), headache, runny nose, sore throat and loss of smell.    COVID-19 can cause severe coughing (bronchitis) and lung infection (pneumonia).    How does it spread?     The virus may spread when a person coughs or sneezes into the air. The virus can travel about 6 feet this way, and it can live on surfaces.      Common  (household disinfectants) will kill the virus.    Who is at risk?  Anyone can catch COVID-19 if they re around someone who has the virus.    How can others protect themselves?     Stay away from people who have COVID-19 (or symptoms of COVID-19).    Wash hands often with soap and water. Or, use hand  with at least 60% alcohol.    Avoid touching the eyes, nose or mouth.     Wear a face mask when you go out in public, when sick or when caring for a sick person.    Where can I get more information?    [facility]: About COVID-19: [email or web address]    CDC: What to Do If You re Sick: www.cdc.gov/coronavirus/2019-ncov/about/steps-when-sick.html    CDC: Ending Home Isolation: www.cdc.gov/coronavirus/2019-ncov/hcp/disposition-in-home-patients.html      CDC: Caring for Someone: www.cdc.gov/coronavirus/2019-ncov/if-you-are-sick/care-for-someone.html    [facility]: Interim Guidance for Hospital Discharge to Home: www.[facility]./diseases/coronavirus/hcp/hospdischarge.pdf    [facility] clinical trials (COVID-19 research studies): [email or web address].[facility]-clinical-trials     Below are the COVID-19 hotlines at the [place] [facility] ([facility]). Interpreters are available.   o For health questions: Call 755-671-8612 or 1-619.535.4429 (7 a.m. to 7 p.m.)  o For questions about schools and childcare: Call 979-931-8865 or 1-248.646.3544 (7 a.m. to 7 p.m.)            Thank you for taking steps to prevent the spread of this virus.  o Limit your contact with others.  o Wear a simple mask to cover your cough.  o Wash your hands well and often.    Resources    [facility]: About COVID-19: [email or web address]    CDC: What to Do If You're Sick: www.cdc.gov/coronavirus/2019-ncov/about/steps-when-sick.html    CDC: Ending Home Isolation: www.cdc.gov/coronavirus/2019-ncov/hcp/disposition-in-home-patients.html     CDC: Caring for Someone: www.cdc.gov/coronavirus/2019-ncov/if-you-are-sick/care-for-someone.html     [facility]: Interim Guidance for Hospital Discharge to Home: www.[facility]./diseases/coronavirus/hcp/hospdischarge.pdf    [facility] clinical trials (COVID-19 research studies): clinicalaffairs.[facility]-clinical-trials     Below are the COVID-19 hotlines at the [place] [facility] ([facility]). Interpreters are available.   o For health questions: Call 502-163-4106 or 1-916.403.8118 (7 a.m. to 7 p.m.)  o For questions about schools and childcare: Call 243-223-8396 or 1-467.884.5573 (7 a.m. to 7 p.m.)        Reason for Disposition    Has diabetes (diabetes mellitus) and any bruising or wound    Additional Information    Negative: Major bleeding (actively dripping or spurting) that can't be stopped    Negative:  Amputation or bone sticking through the skin    Negative: Looks like a dislocated joint (crooked or deformed)    Negative: Sounds like a life-threatening emergency to the triager    Negative: Wound looks infected    Negative: Caused by an animal bite    Negative: Puncture wound of foot    Negative: Toe injury is the main symptom    Negative: Cast problems or questions    Negative: Bullet, stabbed by knife or other serious penetrating wound    Negative: SEVERE pain (e.g., excruciating)    Negative: A 'snap' or 'pop' was heard at the time of injury    Negative: Large swelling or bruise and size > palm of person's hand    Negative: No prior tetanus shots (or is not fully vaccinated) and any wound (e.g., cut or scrape)    Negative: Patient wants to be seen    Negative: Can't stand (bear weight) or walk (e.g., 4 steps)    Negative: Skin is split open or gaping (length > 1/2 inch or 12 mm)    Negative: Bleeding won't stop after 10 minutes of direct pressure (using correct technique)    Negative: Dirt in the wound and not removed after 15 minutes of scrubbing    Negative: Numbness (new loss of sensation) of toe(s)    Negative: Looks infected (e.g., spreading redness, pus, red streak)    Negative: Sounds like a serious injury to the triager    Protocols used: ANKLE AND FOOT INJURY-A-[place]

## 2021-06-11 NOTE — PROGRESS NOTES
ASSESSMENT:  1. Cellulitis of right middle finger          PLAN:  Currently, patient has localized cellulitis.  However,etiology of this chronic nodule is not clear at this time.  Ddx includes chronic paronychia, wart, other.  Will treat the infection with cephalexin. Recommend warm soaks 3-4 times daily.  I'd like her to see her PCP in 1-2 weeks to reassess the underlying lesion once the infection has healed to determine if further work-up/treatment is needed.     SUBJECTIVE:  Yumiko Peña is a 67 y.o. female who presents to walk-in for evaluation of swelling and redness around right middle fingernail for 2-3 years.  It started after she got a manicure and thinks they nicked the nailbed.  It became swollen and she lost part of her nail. The nail eventually grew back but the swelling around the nail would occasionally become painful and red.  It typically improved on its own.  However, 2-3 weeks ago, it became red, swollen, and painful.  She recalls she cut her cuticles around this time. She poked it with a pin yesterday and got mostly clear fluid out of it.  Denies fever, chills.  Has taken no medications for relief.  Has done warm soaks. Denies history of recurrent skin infections.     Patient Active Problem List   Diagnosis     Metabolic syndrome     Hemochromatosis     Postmenopausal       Current Medications:  Current Outpatient Prescriptions on File Prior to Visit   Medication Sig Dispense Refill     ascorbic acid (ASCORBIC ACID WITH JACQUES HIPS) 500 MG tablet Take 500 mg by mouth daily.       BIOTIN ORAL Take by mouth daily.       CALCIUM-MAGNESIUM-ZINC ORAL Take by mouth daily.       CRESTOR 20 mg tablet TAKE ONE TABLET (20 MG) BY MOUTH ONCE DAILY IN THE EVENING 90 tablet 3     furosemide (LASIX) 20 MG tablet Take 1 tablet (20 mg total) by mouth as needed. Edema 30 tablet 11     GLIMEPIRIDE ORAL Take by mouth daily. Does not know her current dose       losartan-hydrochlorothiazide (HYZAAR) 100-25 mg per  tablet TAKE ONE TABLET BY MOUTH ONCE DAILY 90 tablet 3     METFORMIN HCL (METFORMIN ORAL) Take 500 mg by mouth 4 (four) times a day.  4 at [facility]        potassium chloride (KLOR-CON) 10 MEQ CR tablet TAKE ONE TABLET BY MOUTH ONCE DAILY 90 tablet 3     rosuvastatin (CRESTOR) 20 MG tablet Take 1 tablet (20 mg total) by mouth every evening. 90 tablet 3     aspirin 81 MG EC tablet Take 81 mg by mouth daily.       No current facility-administered medications on file prior to visit.        ALLERGY:    Lipitor [atorvastatin]    OBJECTIVE:  /74 (Patient Site: Right Arm, Patient Position: Sitting, Cuff Size: Adult Regular)  Pulse 76  Temp 98.4  F (36.9  C) (Oral)   Resp 18  Wt 178 lb (80.7 kg)  LMP  (LMP Unknown)  SpO2 97%  BMI 30.08 kg/m2  General Appearance: Alert, polite, cooperative. Appears healthy. In NAD.  Extremities: there is a 4-5mm nodule on the proximal nail fold.  There is a central scab with surrounding erythema.  There is no fluctuance.  There is a linear area of deformed nail extending the entire length of the nail. The nail is firmly adherent and intact.  The fingerpad is soft and nontender.

## 2021-06-11 NOTE — TELEPHONE ENCOUNTER
Controlled Substance Refill Request  Medication Name:   Requested Prescriptions     Pending Prescriptions Disp Refills     clonazePAM (KLONOPIN) 0.25 MG disintegrating tablet [Pharmacy Med Name: clonazePAM 0.25 MG Oral Tablet Disintegrating] 60 tablet 0     Sig: DISSOLVE 1 TABLET IN MOUTH TWICE DAILY AS NEEDED     Date Last Fill: 5/1/20  Requested Pharmacy: [facility]  Submit electronically to pharmacy  Controlled Substance Agreement on file:   Encounter-Level CSA Scan Date:    There are no encounter-level csa scan date.        Last office visit:  7/14/20

## 2021-06-11 NOTE — PROGRESS NOTES
OFFICE VISIT NOTE  Yumiko Peña   67 y.o. female            Assessment/Plan for  Yumiko Peña is a 67 y.o. female.  No Patient Care Coordination Note on file.           1.  Hypertension-controlled   2.  Hemochromatosis- phlebotomy recommended 11/2016-did not go through.  Recheck studies iron liver.  3.  History of polycythemia  4.  Torn medial meniscus with pain  5.  Diabetes-followed by Dr. Lowery with A1c coming down 7.3  6.  History of edema-as needed Lasix-refill rarely uses    Plan:  Laboratory  Medication refill  Probably will need phlebotomy- due to iron overload  I should see her annually.  She should continue see Dr. Lowery.  She needs a see oncology annually-recommended Dr. Dayami Mtz    There are no Patient Instructions on file for this visit.      Diagnoses and all orders for this visit:    Hemochromatosis  -     Iron and Transferrin Iron Binding Capacity  -     Ferritin    Hypertension  -     potassium chloride (KLOR-CON) 10 MEQ CR tablet; TAKE ONE TABLET BY MOUTH ONCE DAILY  Dispense: 90 tablet; Refill: 3  -     losartan-hydrochlorothiazide (HYZAAR) 100-25 mg per tablet; TAKE ONE TABLET BY MOUTH ONCE DAILY  Dispense: 90 tablet; Refill: 3    Polycythemia  -     HM1(CBC and Differential)  -     HM1 (CBC with Diff)    Hyperlipidemia  -     rosuvastatin (CRESTOR) 20 MG tablet; Take 1 tablet (20 mg total) by mouth every evening.  Dispense: 90 tablet; Refill: 3  -     Hepatic Profile  -     Cholesterol, Total    Diabetes type 2, controlled  -     Basic Metabolic Panel    Torn meniscus    Edema  -     furosemide (LASIX) 20 MG tablet; Take 1 tablet (20 mg total) by mouth as needed. Edema  Dispense: 30 tablet; Refill: 11                Chago Bernal MD  Internal medicine  [facility]  [phone]  [email or web address]      This is an electronically verified report by Chago Bernal M.D.  (Note created with Dragon voice recognition and unintended spelling errors  and word substitutions may occur)               Subjective:   Chief Complaint:  Hypertension (Routine visit); Diabetes; Medication Refill; and Medication Questions (Discuss furosemide)    Have not seen patient in about 2 years    Did see hematologist 11/2016-recommended phlebotomy-not pursued    Saw orthopedist for knee pain-torn meniscus after October 31.  Cortisone injection helped no wearing off-reviewed note-torn meniscus    Hypertension-There are no cardiovascular, respiratory, neurologic complaints.No claudication.There is no orthostasis.Patient is compliant with medications.  Medications reviewed.   No side effects from medication.    Hyperlipoproteinemia-patient is tolerating medication.  There are no myalgia, arthralgia, weakness.  No Bowel issues.  Liver profile has been normal.  Patient has met cholesterol goals.    Review of Systems:     Extensive 10-point review of systems was performed. Please see the HPI for problem specific pertinent review of systems.     Patient does note patient does well.  Occasionally she will have mild dyspnea.  She is not having chest pains    Otherwise, the following systems are noncontributory including constitutional, eyes, ears, nose and throat, cardiovascular, respiratory, gastrointestinal, genitourinary, musculoskeletal,neurological, skin and/or breast, endocrine, hematologic/lymph, allergic/immunologic and psychiatric.              Medications:  Current Outpatient Prescriptions   Medication Sig Dispense Refill     ascorbic acid (ASCORBIC ACID WITH JACQUES HIPS) 500 MG tablet Take 500 mg by mouth daily.       aspirin 81 MG EC tablet Take 81 mg by mouth daily.       BIOTIN ORAL Take by mouth daily.       CALCIUM-MAGNESIUM-ZINC ORAL Take by mouth daily.       GLIMEPIRIDE ORAL Take by mouth daily. Does not know her current dose       losartan-hydrochlorothiazide (HYZAAR) 100-25 mg per tablet TAKE ONE TABLET BY MOUTH ONCE DAILY 90 tablet 3     METFORMIN HCL (METFORMIN ORAL)  "Take 500 mg by mouth 4 (four) times a day.  4 at [facility]        potassium chloride (KLOR-CON) 10 MEQ CR tablet TAKE ONE TABLET BY MOUTH ONCE DAILY 90 tablet 3     rosuvastatin (CRESTOR) 20 MG tablet Take 1 tablet (20 mg total) by mouth every evening. 90 tablet 3     furosemide (LASIX) 20 MG tablet Take 1 tablet (20 mg total) by mouth as needed. Edema 30 tablet 11     No current facility-administered medications for this visit.      Current Outpatient Prescriptions on File Prior to Visit   Medication Sig     ascorbic acid (ASCORBIC ACID WITH JACQUES HIPS) 500 MG tablet Take 500 mg by mouth daily.     aspirin 81 MG EC tablet Take 81 mg by mouth daily.     BIOTIN ORAL Take by mouth daily.     CALCIUM-MAGNESIUM-ZINC ORAL Take by mouth daily.     GLIMEPIRIDE ORAL Take by mouth daily. Does not know her current dose     METFORMIN HCL (METFORMIN ORAL) Take 500 mg by mouth 4 (four) times a day.  4 at [facility]      [DISCONTINUED] losartan-hydrochlorothiazide (HYZAAR) 100-25 mg per tablet TAKE ONE TABLET BY MOUTH ONCE DAILY     [DISCONTINUED] potassium chloride (KLOR-CON) 10 MEQ CR tablet TAKE ONE TABLET BY MOUTH ONCE DAILY     [DISCONTINUED] rosuvastatin (CRESTOR) 20 MG tablet Take 1 tablet (20 mg total) by mouth every evening.     No current facility-administered medications on file prior to visit.        Allergies:  Allergies   Allergen Reactions     Lipitor [Atorvastatin] Myalgia       PSFHx: Tobacco Status:  She  reports that she quit smoking about 21 years ago. She has never used smokeless tobacco.   Alcohol Status:    History   Alcohol Use No       reports that she quit smoking about 21 years ago. She has never used smokeless tobacco. She reports that she does not drink alcohol or use illicit drugs.    Objective:    /64 (Patient Site: Right Arm, Patient Position: Sitting, Cuff Size: Adult Regular)  Pulse 76  Ht 5' 4.5\" (1.638 m)  Wt 179 lb 1.9 oz (81.2 kg)  LMP  (LMP Unknown)  SpO2 97%  Breastfeeding? No  BMI 30.27 " kg/m2  Weight:   Wt Readings from Last 3 Encounters:   06/05/17 179 lb 1.9 oz (81.2 kg)   12/16/15 185 lb (83.9 kg)     BP Readings from Last 3 Encounters:   06/05/17 112/64   12/16/15 118/64         General-appears well, no acute distress.  Skin is normal without hyperpigmentation  No neck adenopathy  Pulse regular throughout  Cardiac regular without murmur  Lungs perfectly clear  Extremity is no edema excellent pulses      Review of clinical lab tests  Lab Results   Component Value Date    WBC 10.0 08/08/2011    HGB 14.9 08/08/2011    HCT 42.0 08/08/2011     08/08/2011     08/08/2011    K 2.9 (L) 08/08/2011     08/08/2011    CREATININE 0.83 08/08/2011    BUN 19 08/08/2011    CO2 24 08/08/2011       Glucose   Date/Time Value Ref Range Status   08/08/2011 11:25  (H) 70 - 125 mg/dL Final     Comment:          Fasting Glucose reference range is 70-99 mg/dL per       American Diabetes Association (ADA) guidelines.     No results found for this or any previous visit (from the past 24 hour(s)).    RADIOLOGY: No results found.    Review of recent consultation-reviewed Dr. Vogel-hematology  Reviewed Dr. Lowery-endocrinology

## 2021-06-12 NOTE — TELEPHONE ENCOUNTER
Controlled Substance Refill Request  Medication Name:   Requested Prescriptions     Pending Prescriptions Disp Refills     clonazePAM (KLONOPIN) 0.25 MG disintegrating tablet [Pharmacy Med Name: clonazePAM 0.25 MG Oral Tablet Disintegrating] 60 tablet 0     Sig: DISSOLVE 1 TABLET IN MOUTH TWICE DAILY AS NEEDED     Date Last Fill: 9/1/20  Requested Pharmacy: [facility]  Submit electronically to pharmacy  Controlled Substance Agreement on file:   Encounter-Level CSA Scan Date:    There are no encounter-level csa scan date.        Last office visit:  7/14/20

## 2021-06-12 NOTE — PROGRESS NOTES
OFFICE VISIT NOTE            Assessment/Plan for  Yumiko Peña is a 67 y.o. female.  No Patient Care Coordination Note on file.       1.  Chronic paronychia right third index finger  2.  Right sacroiliac pain     Plan:  Lotrimin twice daily  Revisit with Dr. Sunshine-hand surgeon if no improvement  Aleve twice daily ×2 weeks  Notify orthopedic surgeon-Dr. Sparks if back discomfort persists      Diagnoses and all orders for this visit:    Paronychia of finger, right  -     clotrimazole (LOTRIMIN) 1 % cream; Apply topically 2 (two) times a day.  Dispense: 30 g; Refill: 0        Chago Bernal MD  Internal medicine  [facility]  [phone]  [email or web address]    This is an electronically verified report by Chago Bernal M.D.  (Note created with Dragon voice recognition and unintended spelling errors and word substitutions may occur)             Subjective:   Chief Complaint:  Finger Pain (Middle. Right hand. ); Back Pain (Low back, right sided); Abdominal Pain (Lower abd); and Leg Pain (Bilateral)    Paronychia  Received antibiotic from physician now off.  This did improve  History of distal injury from manicure.  Has chronically deformed nail on right third index finger  Has seen Dr. Sunshine for tendon release on the other hand in the past which might be recurring    Some right sided SI pain.  Over the last few weeks    Medications:  Current Outpatient Prescriptions on File Prior to Visit   Medication Sig     ascorbic acid (ASCORBIC ACID WITH JACQUES HIPS) 500 MG tablet Take 500 mg by mouth daily.     aspirin 81 MG EC tablet Take 81 mg by mouth daily.     BIOTIN ORAL Take by mouth daily.     CALCIUM-MAGNESIUM-ZINC ORAL Take by mouth daily.     furosemide (LASIX) 20 MG tablet Take 1 tablet (20 mg total) by mouth as needed. Edema     GLIMEPIRIDE ORAL Take by mouth daily. Does not know her current dose     losartan-hydrochlorothiazide (HYZAAR) 100-25 mg per tablet TAKE ONE  "TABLET BY MOUTH ONCE DAILY     METFORMIN HCL (METFORMIN ORAL) Take 500 mg by mouth 4 (four) times a day.  4 at [facility]      potassium chloride (KLOR-CON) 10 MEQ CR tablet TAKE ONE TABLET BY MOUTH ONCE DAILY     rosuvastatin (CRESTOR) 20 MG tablet Take 1 tablet (20 mg total) by mouth every evening.     [DISCONTINUED] CRESTOR 20 mg tablet TAKE ONE TABLET (20 MG) BY MOUTH ONCE DAILY IN THE EVENING     No current facility-administered medications on file prior to visit.      Allergies:  Allergies   Allergen Reactions     Lipitor [Atorvastatin] Myalgia       Review of Systems:     Extensive 10-point review of systems was performed. Please see the HPI for problem specific pertinent review of systems.     Patient does note her knee feels okay.  She has upcoming visit with her knee surgeon    Otherwise, the following systems are noncontributory including constitutional, eyes, ears, nose and throat, cardiovascular, respiratory, gastrointestinal, genitourinary, musculoskeletal,neurological, skin and/or breast, endocrine, hematologic/lymph, allergic/immunologic and psychiatric.      Objective:    /66 (Patient Site: Left Arm, Patient Position: Sitting, Cuff Size: Adult Regular)  Pulse 74  Temp 98  F (36.7  C)  Ht 5' 4\" (1.626 m)  Wt 177 lb 0.6 oz (80.3 kg)  LMP  (LMP Unknown)  SpO2 95%  Breastfeeding? No  BMI 30.39 kg/m2  Weight:   Wt Readings from Last 3 Encounters:   08/21/17 177 lb 0.6 oz (80.3 kg)   07/12/17 178 lb (80.7 kg)   06/05/17 179 lb 1.9 oz (81.2 kg)     [unfilled]  177 lb 0.6 oz (80.3 kg)    In general, no acute distress.  Right third finger nail paronychia.  Deformed nail  No evidence of felon  Some right SI pain  Normal flexion lumbar spine        Review of clinical lab tests  Lab Results   Component Value Date    WBC 6.6 06/05/2017    HGB 14.6 06/05/2017    HCT 42.2 06/05/2017     06/05/2017    CHOL 150 06/05/2017    ALT 20 06/05/2017    AST 15 06/05/2017     06/05/2017    K 3.8 " 06/05/2017    CL 99 06/05/2017    CREATININE 1.00 06/05/2017    BUN 22 06/05/2017    CO2 26 06/05/2017       Glucose   Date/Time Value Ref Range Status   06/05/2017 03:53  (H) 70 - 125 mg/dL Final   08/08/2011 11:25  (H) 70 - 125 mg/dL Final     Comment:          Fasting Glucose reference range is 70-99 mg/dL per       American Diabetes Association (ADA) guidelines.     No results found for this or any previous visit (from the past 24 hour(s)).    No results found.

## 2021-06-13 NOTE — TELEPHONE ENCOUNTER
Controlled Substance Refill Request  Medication Name:   Requested Prescriptions     Pending Prescriptions Disp Refills     clonazePAM (KLONOPIN) 0.25 MG disintegrating tablet [Pharmacy Med Name: clonazePAM 0.25 MG Oral Tablet Disintegrating] 60 tablet 0     Sig: DISSOLVE 1 TABLET IN MOUTH TWICE DAILY AS NEEDED     Date Last Fill: 10/26/20  Requested Pharmacy: [facility]  Submit electronically to pharmacy  Controlled Substance Agreement on file:   Encounter-Level CSA Scan Date:    There are no encounter-level csa scan date.        Last office visit:  7/14/20

## 2021-06-14 NOTE — TELEPHONE ENCOUNTER
Refill Approved    Rx renewed per Medication Renewal Policy. Medication was last renewed on 1/8/20, last OV 7/14/20.    Genie Hensley, [facility] Connection Triage/Med Refill 1/16/2021     Requested Prescriptions   Pending Prescriptions Disp Refills     losartan (COZAAR) 100 MG tablet [Pharmacy Med Name: Losartan Potassium 100 MG Oral Tablet] 90 tablet 0     Sig: Take 1 tablet by mouth once daily       Angiotensin Receptor Blocker Protocol Passed - 1/15/2021 10:39 AM        Passed - PCP or prescribing provider visit in past 12 months       Last office visit with prescriber/PCP: 3/28/2019 Chago Bernal MD OR same dept: Visit date not found OR same specialty: 6/30/2020 Hayden Nair MD  Last physical: Visit date not found Last MTM visit: Visit date not found   Next visit within 3 mo: Visit date not found  Next physical within 3 mo: Visit date not found  Prescriber OR PCP: Chago Bernal MD  Last diagnosis associated with med order: 1. Essential hypertension  - losartan (COZAAR) 100 MG tablet [Pharmacy Med Name: Losartan Potassium 100 MG Oral Tablet]; Take 1 tablet by mouth once daily  Dispense: 90 tablet; Refill: 0    If protocol passes may refill for 12 months if within 3 months of last provider visit (or a total of 15 months).             Passed - Serum potassium within the past 12 months     Lab Results   Component Value Date    Potassium 4.2 07/14/2020             Passed - Blood pressure filed in past 12 months     BP Readings from Last 1 Encounters:   07/14/20 112/70             Passed - Serum creatinine within the past 12 months     Creatinine   Date Value Ref Range Status   07/14/2020 0.96 0.60 - 1.10 mg/dL Final

## 2021-06-14 NOTE — TELEPHONE ENCOUNTER
Form partially filled out and placed in Dr. Piña's basket for review.  Janine Carver CMA ............... 2:01 PM, 01/18/21

## 2021-06-14 NOTE — TELEPHONE ENCOUNTER
Refill Approved    Rx renewed per Medication Renewal Policy. Medication was last renewed on 1/8/20.    Ema Cast, [facility] Connection Triage/Med Refill 1/21/2021     Requested Prescriptions   Pending Prescriptions Disp Refills     hydroCHLOROthiazide (HYDRODIURIL) 25 MG tablet [Pharmacy Med Name: hydroCHLOROthiazide 25 MG Oral Tablet] 90 tablet 0     Sig: Take 1 tablet by mouth once daily       Diuretics/Combination Diuretics Refill Protocol  Passed - 1/20/2021 11:27 AM        Passed - Visit with PCP or prescribing provider visit in past 12 months     Last office visit with prescriber/PCP: 3/28/2019 Chago Bernal MD OR same dept: Visit date not found OR same specialty: 6/30/2020 Hayden Nair MD  Last physical: Visit date not found Last MTM visit: Visit date not found   Next visit within 3 mo: Visit date not found  Next physical within 3 mo: Visit date not found  Prescriber OR PCP: Chago Bernal MD  Last diagnosis associated with med order: 1. Essential hypertension  - hydroCHLOROthiazide (HYDRODIURIL) 25 MG tablet [Pharmacy Med Name: hydroCHLOROthiazide 25 MG Oral Tablet]; Take 1 tablet by mouth once daily  Dispense: 90 tablet; Refill: 0    If protocol passes may refill for 12 months if within 3 months of last provider visit (or a total of 15 months).             Passed - Serum Potassium in past 12 months      Lab Results   Component Value Date    Potassium 4.2 07/14/2020             Passed - Serum Sodium in past 12 months      Lab Results   Component Value Date    Sodium 139 07/14/2020             Passed - Blood pressure on file in past 12 months     BP Readings from Last 1 Encounters:   07/14/20 112/70             Passed - Serum Creatinine in past 12 months      Creatinine   Date Value Ref Range Status   07/14/2020 0.96 0.60 - 1.10 mg/dL Final

## 2021-06-14 NOTE — TELEPHONE ENCOUNTER
Left voicemail for patient to return call to clinic. When patient returns call, please give them below message.    Does patient want to pick this up or should we mail it to her?    Janine Carver CMA ............... 5:09 PM, 01/19/21

## 2021-06-14 NOTE — TELEPHONE ENCOUNTER
Reason for Call:  Other Handicap parking sticker     Detailed comments: Pt is having knee surgery on 3/25/2021. Pt is wondering if you can fill out a handicap permit for her prior to her surgery or if this will have to be filled out after her surgery? She is not sure if you can write one foe her one month prior to her surgery.     Phone Number Patient can be reached at: Home number on file [phone] (home)    Best Time: anytime    Can we leave a detailed message on this number?: Yes    Call taken on 1/18/2021 at 11:39 AM by Livia Simmons

## 2021-06-14 NOTE — TELEPHONE ENCOUNTER
Form completed and signed, indication is advanced osteoarthritis both knees, expiration August 2021

## 2021-06-14 NOTE — TELEPHONE ENCOUNTER
Patient Returning Call  Reason for call:  It is ok to mail the application to her home. Did verify her address as well  Information relayed to patient:  Yes  Patient has additional questions:  No  If YES, what are your questions/concerns:  NA  Okay to leave a detailed message?: No call back needed

## 2021-06-15 NOTE — PROGRESS NOTES
[facility]  [address] [place] [id number]  Dept: [phone]  Dept Fax: [phone]  Primary Provider: Jose Cardenas MD  Pre-op Performing Provider: JOSE CARDENAS      PREOPERATIVE EVALUATION:  Today's date: 3/9/2021    Yumiko Peña is a 70 y.o. female who presents for a preoperative evaluation.    Surgical Information:  Surgery/Procedure: Right Total Knee Arthroplasty  Surgery Location: [facility]  Surgeon: Dr. Sparks  Surgery Date: 3/25/2021  Time of Surgery: 5:30 AM  Where patient plans to recover: At home with family after a one night stay with [facility].  Fax number for surgical facility:     Type of Anesthesia Anticipated: General    Assessment & Plan      The proposed surgical procedure is considered INTERMEDIATE risk.    Satisfactory preoperative medical exam for planned right total knee arthroplasty scheduled for March 25, 2021, to be done at [facility] in [place] by Dr. Sparks for indication of advanced osteoarthritis.    For preoperative testing today March 9 we will get an A1c, basic metabolic panel, and blood cell counts.  Her EKG is normal.  LATER: A1c 7.8, not great, but not terrible, satisfactory for preop purposes.  Basic metabolic panel normal except for glucose 143.  CBC normal.    She need to get a Covid test before her procedure, which will be arranged by [facility], typically done within 5 days of her surgery.    No history of any complications with general anesthesia, bleeding, or clotting.    With regards to her medications:    I asked her to check with [facility] about stopping baby aspirin.  I would give her the preliminary guidance to stop the aspirin 1 week before her procedure.  DVT prophylaxis per the discretion of her surgeon.    With regards to her other medications:    Evening before surgery, okay to take the pioglitazone, glimepiride, and rosuvastatin, clonazepam,  but do NOT take the Metformin.  Be sure to eat a very small supper, best to avoid starches and sweets.     On the morning of surgery, since she will be n.p.o. and her blood pressure is very good, I told her to NOT take her losartan, hydrochlorothiazide, or potassium that morning of surgery.      RECOMMENDATION:  APPROVAL GIVEN to proceed with proposed procedure, without further diagnostic evaluation.      Subjective     HPI related to upcoming procedure:    Bilateral knee osteoarthritis, right worse than left, told bone-on-bone    Surgical history  KNEE ARTHROSCOPY  2000   TUBAL LIGATION     NO anaesthesia complication  No problems with bleeding or clotting     Right cervical radiculopathy sensory symptoms, with right trapezius ridge pain, suggest she asked [facility] about doing physical therapy for that.      Type 2 diabetes, currently on 3 oral medications of metformin, glimepiride, and pioglitazone, with suboptimally controlled A1c and elevated morning blood sugars.  She is currently working with Dr. Lowery, endocrinology at [facility].  She told me that Dr. Lowery is considering having her take an evening dose of insulin.  She also needs to reduce the size of her supper.  She does not have any history of neuropathy, retinopathy, or nephropathy.  No history of heart attack or stroke either.    Saw Dr Lowery in November 2020  A1c was 7.6    Weight crept up a few pounds  Home finger sticks 170 in afternoon  Mornings occasionally over 200    Last night March 8 spaghetti and and meatballs    Essential hypertension, in the context of diabetes, with good blood pressure control on losartan plus hydrochlorothiazide with potassium supplementation.   BP Readings from Last 3 Encounters:   03/09/21 100/60   07/14/20 112/70   06/30/20 104/65     Lab Results   Component Value Date    CREATININE 0.96 07/14/2020    BUN 21 07/14/2020     07/14/2020    K 4.2 07/14/2020     07/14/2020    CO2 27 07/14/2020     Mild renal  insufficiency with GFR 49.       Hyperlipidemia, in the context of diabetes and hypertension, with no history of cardiovascular events, on moderate intensity rosuvastatin.    Lipid panel done at [facility] November 4, 2020 had total cholesterol 172, triglycerides mildly elevated 191, but HDL was good at 57, LDL well controlled at 77.    Asymptomatic coronary artery disease, with coronary calcification study performed May 1, 2018 showing mild to moderate multivessel coronary artery disease, but no severe stenoses.  She takes a baby aspirin per day, and should continue on that.     Overweight with body mass index of 29.6, needs to lose about 30 pounds.  Wt Readings from Last 3 Encounters:   03/09/21 176 lb 9.6 oz (80.1 kg)   07/14/20 170 lb (77.1 kg)   02/11/20 172 lb 6.4 oz (78.2 kg)     Restless leg syndrome, takes clonazepam for that, which controls symptoms well.     History of treatment for hemochromatosis, but had satisfactory ferritin levels and normal liver chemistry test done in February 2020.         Lab Results   Component Value Date     FERRITIN 47 02/11/2020            Lab Results   Component Value Date     ALT 20 02/11/2020     AST 13 02/11/2020     ALKPHOS 46 02/11/2020     BILITOT 0.8 02/11/2020      Low back pain, was probably a muscle strain presenting in October 2019, has resolved.     Post menopause, satisfactory DEXA bone density scan, with actually positive T-scores performed January 27, 2020. Continue with calcium supplement and weightbearing exercise     Rhinitis, for which he uses nasal steroid fluticasone    Anxiety and insomnia  Klonazepam Nightly      Screening colonoscopy done April 2020, she told good for 5 years     Screening mammogram is due about now, and she is going to schedule that herself.      Preop Questions 3/9/2021   Have you ever had a heart attack or stroke? No   Have you ever had surgery on your heart or blood vessels, such as a stent placement, a coronary artery bypass, or  surgery on an artery in your head, neck, heart, or legs? No   Do you have chest pain with activity? No   Do you have a history of  heart failure? No   Do you currently have a cold, bronchitis or symptoms of other infection? No   Do you have a cough, shortness of breath, or wheezing? No   Do you or anyone in your family have previous history of blood clots? YES - Mother   Do you or does anyone in your family have a serious bleeding problem such as prolonged bleeding following surgeries or cuts? No   Have you ever had problems with anemia or been told to take iron pills? No   Have you had any abnormal blood loss such as black, tarry or bloody stools, or abnormal vaginal bleeding? No   Have you ever had a blood transfusion? No   Are you willing to have a blood transfusion if it is medically needed before, during, or after your surgery? Yes   Have you or any of your relatives ever had problems with anesthesia? No   Do you have sleep apnea, excessive snoring or daytime drowsiness? YES - Snores, but no diagnosis of SIM   Do you have a CPAP machine? No   Do you have any artifical heart valves or other implanted medical devices like a pacemaker, defibrillator, or continuous glucose monitor? No   Do you have artificial joints? No   Are you allergic to latex? No       Review of Systems  CONSTITUTIONAL: NEGATIVE for fever, chills, change in weight  INTEGUMENTARY/SKIN: NEGATIVE for worrisome rashes, moles or lesions  EYES: NEGATIVE for vision changes or irritation  ENT/MOUTH: NEGATIVE for ear, mouth and throat problems  RESP: NEGATIVE for significant cough or SOB  BREAST: NEGATIVE for masses, tenderness or discharge  CV: NEGATIVE for chest pain, palpitations or peripheral edema  GI: NEGATIVE for nausea, abdominal pain, heartburn, or change in bowel habits  : NEGATIVE for frequency, dysuria, or hematuria  MUSCULOSKELETAL: NEGATIVE for significant arthralgias or myalgia  NEURO: NEGATIVE for weakness, dizziness or  paresthesias  ENDOCRINE: NEGATIVE for temperature intolerance, skin/hair changes  HEME: NEGATIVE for bleeding problems  PSYCHIATRIC: NEGATIVE for changes in mood or affect    Patient Active Problem List    Diagnosis Date Noted     Restless legs syndrome (RLS) 09/01/2020     Primary osteoarthritis of both knees 07/14/2020     Cervical radiculopathy 07/14/2020     Coronary artery disease involving native coronary artery of native heart without angina pectoris 05/24/2018     Abnormal nuclear stress test 04/17/2018     Diabetes (H) 01/19/2016     Essential hypertension 01/19/2016     Metabolic syndrome 01/19/2016     Dyslipidemia, goal LDL below 100 01/19/2016     Hemochromatosis 01/19/2016     Postmenopausal 01/19/2016     Past Medical History:   Diagnosis Date     DM type 2 (diabetes mellitus, type 2) (H)      Hemochromatosis      Hyperlipidemia      Hypertension      Past Surgical History:   Procedure Laterality Date     CATARACT EXTRACTION Right 11/2019     KNEE ARTHROSCOPY  2000     TUBAL LIGATION       vocal cord polyp       Current Outpatient Medications   Medication Sig Dispense Refill     aspirin 81 MG EC tablet Take 1 tablet (81 mg total) by mouth daily. 30 tablet 6     BIOTIN ORAL Take 2,500 mcg by mouth daily .             CALCIUM-MAGNESIUM-ZINC ORAL Take 1 tablet by mouth daily .             clonazePAM (KLONOPIN) 0.25 MG disintegrating tablet DISSOLVE 1 TABLET IN MOUTH TWICE DAILY AS NEEDED 60 tablet 0     fluticasone (FLONASE) 50 mcg/actuation nasal spray 1 spray into each nostril 2 (two) times a day as needed.       GLIMEPIRIDE ORAL Take 4 mg by mouth daily .             hydroCHLOROthiazide (HYDRODIURIL) 25 MG tablet Take 1 tablet by mouth once daily 90 tablet 1     ibuprofen (ADVIL,MOTRIN) 200 MG tablet Take 200 mg by mouth every 6 (six) hours as needed for pain.       losartan (COZAAR) 100 MG tablet Take 1 tablet (100 mg total) by mouth daily. 90 tablet 1     magnesium oxide (MAGOX) 400 mg (241.3 mg  magnesium) tablet Take 1 tablet (400 mg total) by mouth daily. 200 tablet 3     METFORMIN HCL (METFORMIN ORAL) Take 2,000 mg by mouth at bedtime .             pioglitazone (ACTOS) 15 MG tablet Take 15 mg by mouth daily.       potassium chloride (KLOR-CON) 10 MEQ CR tablet Take 1 tablet (10 mEq total) by mouth daily. 90 tablet 2     prednisoLONE acetate (PRED-FORTE) 1 % ophthalmic suspension prednisolone acetate 1 % eye drops,suspension       rosuvastatin (CRESTOR) 20 MG tablet Take 1 tablet (20 mg total) by mouth every evening. 90 tablet 1     VITAMIN A ORAL Take 2,400 mcg by mouth daily.       No current facility-administered medications for this visit.        Allergies   Allergen Reactions     Lipitor [Atorvastatin] Myalgia       Social History     Tobacco Use     Smoking status: Former Smoker     Quit date: 1995     Years since quittin.2     Smokeless tobacco: Never Used   Substance Use Topics     Alcohol use: No      Social History     Substance and Sexual Activity   Drug Use No        Objective     LMP  (LMP Unknown)   Physical Exam   /60 (Patient Site: Right Arm, Patient Position: Sitting, Cuff Size: Adult Regular)   Pulse 72   Temp 98.1  F (36.7  C) (Oral)   Wt 176 lb 9.6 oz (80.1 kg)   LMP  (LMP Unknown)   SpO2 97%   BMI 30.08 kg/m      General: Alert, in no distress  Skin: No significant lesion seen.  Eyes/nose/throat: Eyes without scleral icterus, eye movements normal, pupils equal and reactive, oropharynx clear  MSK: Neck with good ROM  Lymphatic: Neck without adenopathy or masses  Pulm: Lungs clear to auscultation bilaterally  Cardiac: Heart with regular rate and rhythm, no murmur or gallop  GI: Abdomen soft, nontender. No palpable enlargement of liver or spleen  MSK: Extremities no tenderness or edema  + Bony hypertrophy both knees, but no significant effusion  Neuro: Moves all extremities, without focal weakness  Psych: Alert, normal mental status. Normal affect and  speech      Recent Labs   Lab Test 07/14/20  1218 02/11/20  1117 03/28/19  1012 03/28/19  1012   HGB  --  14.7  --  15.6   PLT  --  258  --  236    140   < > 140   K 4.2 4.6   < > 4.5   CREATININE 0.96 1.09   < > 1.11*    < > = values in this interval not displayed.        PRE-OP Diagnostics:   Recent Results (from the past 48 hour(s))   Electrocardiogram Perform and Read    Collection Time: 03/09/21  3:40 PM   Result Value Ref Range    SYSTOLIC BLOOD PRESSURE      DIASTOLIC BLOOD PRESSURE      VENTRICULAR RATE 68 BPM    ATRIAL RATE 68 BPM    P-R INTERVAL 136 ms    QRS DURATION 72 ms    Q-T INTERVAL 414 ms    QTC CALCULATION (BEZET) 440 ms    P Axis 41 degrees    R AXIS 35 degrees    T AXIS 26 degrees    MUSE DIAGNOSIS       Normal sinus rhythm  Normal ECG  When compared with ECG of 08-AUG-2011 11:27,  No significant change was found  Confirmed by ZHEN MORALES MD LOC: ([id number]) on 3/10/2021 12:25:46 PM     Basic Metabolic Panel    Collection Time: 03/09/21  4:20 PM   Result Value Ref Range    Sodium 139 136 - 145 mmol/L    Potassium 4.5 3.5 - 5.0 mmol/L    Chloride 101 98 - 107 mmol/L    CO2 28 22 - 31 mmol/L    Anion Gap, Calculation 10 5 - 18 mmol/L    Glucose 143 (H) 70 - 125 mg/dL    Calcium 9.8 8.5 - 10.5 mg/dL    BUN 25 8 - 28 mg/dL    Creatinine 0.95 0.60 - 1.10 mg/dL    GFR MDRD Af Amer >60 >60 mL/min/1.73m2    GFR MDRD Non Af Amer 58 (L) >60 mL/min/1.73m2   Glycosylated Hemoglobin A1c    Collection Time: 03/09/21  4:20 PM   Result Value Ref Range    Hemoglobin A1c 7.8 (H) <=5.6 %   HM1 (CBC with Diff)    Collection Time: 03/09/21  4:20 PM   Result Value Ref Range    WBC 6.3 4.0 - 11.0 thou/uL    RBC 4.35 3.80 - 5.40 mill/uL    Hemoglobin 14.6 12.0 - 16.0 g/dL    Hematocrit 41.6 35.0 - 47.0 %    MCV 96 80 - 100 fL    MCH 33.6 27.0 - 34.0 pg    MCHC 35.1 32.0 - 36.0 g/dL    RDW 11.9 11.0 - 14.5 %    Platelets 290 140 - 440 thou/uL    MPV 10.1 (H) 7.0 - 10.0 fL    Neutrophils % 48 (L) 50 - 70 %     Lymphocytes % 40 20 - 40 %    Monocytes % 9 2 - 10 %    Eosinophils % 2 0 - 6 %    Basophils % 1 0 - 2 %    Immature Granulocyte % 0 <=0 %    Neutrophils Absolute 3.0 2.0 - 7.7 thou/uL    Lymphocytes Absolute 2.5 0.8 - 4.4 thou/uL    Monocytes Absolute 0.6 0.0 - 0.9 thou/uL    Eosinophils Absolute 0.2 0.0 - 0.4 thou/uL    Basophils Absolute 0.1 0.0 - 0.2 thou/uL    Immature Granulocyte Absolute 0.0 <=0.0 thou/uL         REVISED CARDIAC RISK INDEX (RCRI)   The patient has the following serious cardiovascular risks for perioperative complications:   - No serious cardiac risks = 0 points    RCRI INTERPRETATION: 0 points: Class I (very low risk - 0.4% complication rate)       Signed Electronically by: Jose Piña MD    Copy of this evaluation report is provided to requesting physician.

## 2021-06-15 NOTE — PATIENT INSTRUCTIONS - HE
Satisfactory preoperative medical exam for planned right total knee arthroplasty scheduled for March 25, 2021, to be done at [facility] in [place] by Dr. Sparks for indication of advanced osteoarthritis.     For preoperative testing today March 9 we will get an A1c, basic metabolic panel, and blood cell counts.  Her EKG is normal.     She need to get a Covid test before her procedure, which will be arranged by [facility], typically done within 5 days of her surgery.     No history of any complications with general anesthesia, bleeding, or clotting.     With regards to her medications:     I asked her to check with [facility] about stopping baby aspirin.  I would give her the preliminary guidance to stop the aspirin 1 week before her procedure.  DVT prophylaxis per the discretion of her surgeon.     With regards to her other medications:     Evening before surgery, okay to take the pioglitazone, glimepiride, and rosuvastatin, clonazepam, but do NOT take the Metformin.  Be sure to eat a very small supper, best to avoid starches and sweets.      On the morning of surgery, since she will be n.p.o. and her blood pressure is very good, I told her to NOT take her losartan, hydrochlorothiazide, or potassium that morning of surgery.

## 2021-06-15 NOTE — TELEPHONE ENCOUNTER
Refill Approved    Rx renewed per Medication Renewal Policy. Medication was last renewed on 5/19/20.    [place] Alejandro, [facility] Connection Triage/Med Refill 2/17/2021     Requested Prescriptions   Pending Prescriptions Disp Refills     rosuvastatin (CRESTOR) 20 MG tablet [Pharmacy Med Name: Rosuvastatin Calcium 20 MG Oral Tablet] 90 tablet 0     Sig: TAKE 1 TABLET BY MOUTH ONCE DAILY IN THE EVENING       Statins Refill Protocol (Hmg CoA Reductase Inhibitors) Passed - 2/17/2021  9:51 AM        Passed - PCP or prescribing provider visit in past 12 months      Last office visit with prescriber/PCP: 3/28/2019 Chago Bernal MD OR same dept: Visit date not found OR same specialty: 6/30/2020 Hayden Nair MD  Last physical: Visit date not found Last MTM visit: Visit date not found   Next visit within 3 mo: Visit date not found  Next physical within 3 mo: Visit date not found  Prescriber OR PCP: Chago Bernal MD  Last diagnosis associated with med order: 1. Dyslipidemia, goal LDL below 100  - rosuvastatin (CRESTOR) 20 MG tablet [Pharmacy Med Name: Rosuvastatin Calcium 20 MG Oral Tablet]; TAKE 1 TABLET BY MOUTH ONCE DAILY IN THE EVENING  Dispense: 90 tablet; Refill: 0    If protocol passes may refill for 12 months if within 3 months of last provider visit (or a total of 15 months).

## 2021-06-15 NOTE — TELEPHONE ENCOUNTER
Controlled Substance Refill Request  Medication Name:   Requested Prescriptions     Pending Prescriptions Disp Refills     clonazePAM (KLONOPIN) 0.25 MG disintegrating tablet [Pharmacy Med Name: clonazePAM 0.25 MG Oral Tablet Disintegrating] 60 tablet 0     Sig: DISSOLVE 1 TABLET IN MOUTH TWICE DAILY AS NEEDED     Date Last Fill: 12/21/20  Requested Pharmacy: [facility]  Submit electronically to pharmacy  Controlled Substance Agreement on file:   Encounter-Level CSA Scan Date:    There are no encounter-level csa scan date.        Last office visit:  7/14/20

## 2021-06-16 PROBLEM — M17.0 PRIMARY OSTEOARTHRITIS OF BOTH KNEES: Status: ACTIVE | Noted: 2020-07-14

## 2021-06-16 PROBLEM — I25.10 CORONARY ARTERY DISEASE INVOLVING NATIVE CORONARY ARTERY OF NATIVE HEART WITHOUT ANGINA PECTORIS: Status: ACTIVE | Noted: 2018-05-24

## 2021-06-16 PROBLEM — G25.81 RESTLESS LEGS SYNDROME (RLS): Status: ACTIVE | Noted: 2020-09-01

## 2021-06-16 PROBLEM — M54.12 CERVICAL RADICULOPATHY: Status: ACTIVE | Noted: 2020-07-14

## 2021-06-16 PROBLEM — R94.39 ABNORMAL NUCLEAR STRESS TEST: Status: ACTIVE | Noted: 2018-04-17

## 2021-06-16 NOTE — TELEPHONE ENCOUNTER
Telephone Encounter by Cheyenne Mcdonald CMA at 10/11/2019  5:06 PM     Author: Cheyenne Mcdonald CMA Service: -- Author Type: Medical Assistant    Filed: 10/11/2019  5:07 PM Encounter Date: 10/11/2019 Status: Signed    : Cheyenne Mcdonald CMA (Medical Assistant)       Spoke with the patient and relayed the following message from Dr. Bernal:  Chago Bernal MD  You; [facility] Support Pool 29 minutes ago (4:32 PM)      Another provider please      She verbalized understanding and had no further questions at this time.  Patient has been scheduled to see Dr. Lilly on 10/28/19 at 10:20 am.  She had no further questions at this time.  Cheyenne NATARAJAN CMA/SHARONDA....................5:07 PM

## 2021-06-16 NOTE — PROGRESS NOTES
OFFICE VISIT NOTE  Yumiko Peña   67 y.o. female            Assessment/Plan for  Yumiko Peña is a 67 y.o. female.  No Patient Care Coordination Note on file.       There are no diagnoses linked to this encounter.   1.  Dyspnea in diabetic hypertensive woman with hyperlipidemia.  Not classic for ischemia however with multiple risk factors will order nuclear stress test and follow-up  2.  Hypertension controlled  3.  Diabetes mellitus type 2-needs better control.  Her endocrinologist Dr. Abdoul Lowery is considering adding insulin.  I will get an A1c  4.  Hyperlipidemia-on treatment    5.  Situational anxiety- shoulder surgery, new A. fib, tobacco cessation, some stress.  Had Ativan last night which was helpful.  Discussed short-term benzodiazepine.  Rx Klonopin 0.25 mg at bedtime.  I have given enough for a month with 1 refill I will see back in 4-6 weeks  6.  Abnormal lab last night with low bicarb slightly increased BUN will recheck  Plan:  Laboratory-include recheck BMP and BNP with dyspnea  A1c, TSH  Nuclear stress test  Klonopin  Follow-up 4-6 weeks    There are no Patient Instructions on file for this visit.    There are no diagnoses linked to this encounter.    Medications after visit  Current Outpatient Prescriptions   Medication Sig Dispense Refill     ascorbic acid (ASCORBIC ACID WITH JACQUES HIPS) 500 MG tablet Take 500 mg by mouth daily.       aspirin 81 MG EC tablet Take 81 mg by mouth daily.       BIOTIN ORAL Take by mouth daily.       CALCIUM-MAGNESIUM-ZINC ORAL Take by mouth daily.       clotrimazole (LOTRIMIN) 1 % cream Apply topically 2 (two) times a day. 30 g 0     fluticasone (FLONASE) 50 mcg/actuation nasal spray 1 spray into each nostril 2 (two) times a day as needed.       furosemide (LASIX) 20 MG tablet Take 1 tablet (20 mg total) by mouth as needed. Edema 30 tablet 11     GLIMEPIRIDE ORAL Take by mouth daily. Does not know her current dose       losartan-hydrochlorothiazide  (HYZAAR) 100-25 mg per tablet TAKE ONE TABLET BY MOUTH ONCE DAILY 90 tablet 3     METFORMIN HCL (METFORMIN ORAL) Take 500 mg by mouth 4 (four) times a day.  4 at [facility]        potassium chloride (KLOR-CON) 10 MEQ CR tablet TAKE ONE TABLET BY MOUTH ONCE DAILY 90 tablet 3     rosuvastatin (CRESTOR) 20 MG tablet Take 1 tablet (20 mg total) by mouth every evening. 90 tablet 3     No current facility-administered medications for this visit.                 This provider spent greater than 40 min. face-to-face time with the patient and/or his family.  More than half this time was spent in counseling and or coordination of care with other providers or agencies which were consistent with the nature of this patient's problems which are listed and described in the assessment and plan.      Chgao Bernal MD  Internal medicine  [facility]  [phone]  [email or web address]    Much or all of the text in this note was generated through the use of Dragon Dictate voice-to-text software. Errors in spelling or words which seem out of context are unintentional.   Sound alike errors, in particular, may have escaped editing.                 Subjective:   Chief Complaint:  Shortness of Breath (Having SOB this whole week, worse when laying flat or sitting still, went to Urgent Care last night([place]) , maybe could be anxiety, was given Ativan and seemed to help)    Patient here with shortness of breath  At least a week  Has had intermittently in the past  Situational stress at home- with shoulder surgery, postop A. fib, tobacco dependence with cessation and mood change.    Patient mostly has this at rest.  Might awaken at night.  Really not exertional  No chest pain  She is a diabetic  She has some peripheral foot paresthesia but no osiel neuropathy.  She has had a distant stress test normal greater than 20 years ago.  Hypertension-There are no cardiovascular, respiratory, neurologic  complaints.No claudication.There is no orthostasis.Patient is compliant with medications.  Medications reviewed.   No side effects from medication.  Hyperlipoproteinemia-patient is tolerating medication.  There are no myalgia, arthralgia, weakness.  No Bowel issues.   Review of Systems:     Extensive 10-point review of systems was performed. Please see the HPI for problem specific pertinent review of systems.     Patient does note her appetite is good  She does have some GERD with vocal cord polyp and history of esophageal reflux.  She has not had any osiel aspiration    Otherwise, the following systems are noncontributory including constitutional, eyes, ears, nose and throat, cardiovascular, respiratory, gastrointestinal, genitourinary, musculoskeletal,neurological, skin and/or breast, endocrine, hematologic/lymph, allergic/immunologic and psychiatric.              Medications:  Current Outpatient Prescriptions on File Prior to Visit   Medication Sig     ascorbic acid (ASCORBIC ACID WITH JACQUES HIPS) 500 MG tablet Take 500 mg by mouth daily.     aspirin 81 MG EC tablet Take 81 mg by mouth daily.     BIOTIN ORAL Take by mouth daily.     CALCIUM-MAGNESIUM-ZINC ORAL Take by mouth daily.     clotrimazole (LOTRIMIN) 1 % cream Apply topically 2 (two) times a day.     furosemide (LASIX) 20 MG tablet Take 1 tablet (20 mg total) by mouth as needed. Edema     GLIMEPIRIDE ORAL Take by mouth daily. Does not know her current dose     losartan-hydrochlorothiazide (HYZAAR) 100-25 mg per tablet TAKE ONE TABLET BY MOUTH ONCE DAILY     METFORMIN HCL (METFORMIN ORAL) Take 500 mg by mouth 4 (four) times a day.  [facility]      potassium chloride (KLOR-CON) 10 MEQ CR tablet TAKE ONE TABLET BY MOUTH ONCE DAILY     rosuvastatin (CRESTOR) 20 MG tablet Take 1 tablet (20 mg total) by mouth every evening.     No current facility-administered medications on file prior to visit.             Allergies:  Allergies   Allergen Reactions     Lipitor  "[Atorvastatin] Myalgia       PSFHx: Tobacco Status:  She  reports that she quit smoking about 22 years ago. She has never used smokeless tobacco.   Alcohol Status:    History   Alcohol Use No       reports that she quit smoking about 22 years ago. She has never used smokeless tobacco. She reports that she does not drink alcohol or use illicit drugs.    Objective:    /70 (Patient Site: Left Arm, Patient Position: Sitting, Cuff Size: Adult Regular)  Pulse 80  Ht 5' 4\" (1.626 m)  Wt 172 lb (78 kg)  LMP  (LMP Unknown)  SpO2 98%  BMI 29.52 kg/m2  Weight:   Wt Readings from Last 3 Encounters:   03/15/18 172 lb (78 kg)   08/21/17 177 lb 0.6 oz (80.3 kg)   07/12/17 178 lb (80.7 kg)     BP Readings from Last 3 Encounters:   03/15/18 120/70   08/21/17 120/66   07/12/17 120/74         General-appears well, no acute distress.  Skin: Normal. No rash or lesion  Head:  Normocephalic, symmetric  Speech-clear  Eyes: Eyes midline full EOM.  External exams normal.  No icterus  Neck:  No palpable masses, lymphadenopathy or tenderness. No thyromegaly or goiter  Carotid Arteries:  Equal pulsations bilateral.No Bruit, normal upstroke  Chest Wall: No deformity or pain elicited on compression.  Respiratory:  Normal respiratory effort.  Lungs are clear with good breath sounds.  No dullness.  No wheezing.  Heart: Regular rhythm.  Normal sounding S1, S2 without S3, S4, murmurs, rubs, or gallops.  Extremities-no edema excellent pulses.         Review of clinical lab tests   Review of recent consultation-       Urgency room reviewed last hs  Vitals:  /71  Pulse 65  Temp 98.4  F (36.9  C)  SpO2 98%     Physical Exam  General: Alert, cooperative, appropriate.  HENT: Head atraumatic. Posterior oropharynx is clear and unremarkable; no tonsillar exudate or swelling. Uvula midline. Mucous membranes moist. Outer ears normal.  Eyes: Conjunctivae and sclera normal. Pupils equal, round and reactive to light. EOM intact.  Neck: Normal " range of motion, painless.  Lymph: No cervical lymphadenopathy.  Cardiac: Normal rate and rhythm. Normal heart sounds, no murmur. Peripheral pulses intact.  Pulmonary: Normal respiratory effort. Clear and equal breath sounds bilaterally to auscultation.  Abdomen: Soft, non-distended and non-tender to palpation.   Musculoskeletal: No focal tenderness, swelling or bony deformities. No edema or calf tenderness. No palpable cords.  Neurological: Alert, no focal deficits.  Skin: Normal appearance. No rash noted.  Psych: Normal affect.        Reviewed care everywhere  Normal CBC  She has a low bicarb she has a high BUN    UR Course   Laboratory:  BMP: co2-21 (L), ag-16 (H), gluc rand-229 (H), ca-10.4 (H), bun-26 (H), b/c ratio-26 (H), gfr not af-55 (L), o/w WNL, creat-1.00  Troponin: <0.01  D-dimer: 0.33  CBC: WBC 7.2 (WNL), HGB 15.0 (WNL),  (WNL)    ECG:  Indication: shortness of breath, help rule out cardiac etiology.  Time taken: 1810  Findings: Sinus rhythm. Low QRS voltage in chest leads (QRS deflection < 1.0 mv in chest leads). Atypical ECG. Vent. rate 70 bpm, R-axes 60.    Imaging:   XR Chest: Calcification below the left coracoid process could be a calcified  lymph node or intra-articular loose body. Report per radiology.

## 2021-06-16 NOTE — PROGRESS NOTES
Call    Lab shows high glucose    Can affect kidney    Call dr linares    I think you need med change    prob insulin as he discussed

## 2021-06-17 NOTE — TELEPHONE ENCOUNTER
RN cannot approve Refill Request    RN can NOT refill this medication   Patient request early refill. Medication last filled 2/17/21 for qty 90 refill 1. Provider to advise on request. . Last office visit: Visit date not found Last Physical: 3/9/2021 Last MTM visit: Visit date not found Last visit same specialty: 6/30/2020 Hayden Nair MD.  Next visit within 3 mo: Visit date not found  Next physical within 3 mo: Visit date not found      Neville Alejandro, [facility] Triage/Med Refill 5/24/2021    Requested Prescriptions   Pending Prescriptions Disp Refills     rosuvastatin (CRESTOR) 20 MG tablet [Pharmacy Med Name: Rosuvastatin Calcium 20 MG Oral Tablet] 90 tablet 0     Sig: TAKE 1 TABLET BY MOUTH ONCE DAILY IN THE EVENING       Statins Refill Protocol (Hmg CoA Reductase Inhibitors) Passed - 5/24/2021  8:31 AM        Passed - PCP or prescribing provider visit in past 12 months      Last office visit with prescriber/PCP: Visit date not found OR same dept: Visit date not found OR same specialty: 6/30/2020 Hayden Nair MD  Last physical: 3/9/2021 Last MTM visit: Visit date not found   Next visit within 3 mo: Visit date not found  Next physical within 3 mo: Visit date not found  Prescriber OR PCP: Jose Piña MD  Last diagnosis associated with med order: 1. Dyslipidemia, goal LDL below 100  - rosuvastatin (CRESTOR) 20 MG tablet [Pharmacy Med Name: Rosuvastatin Calcium 20 MG Oral Tablet]; TAKE 1 TABLET BY MOUTH ONCE DAILY IN THE EVENING  Dispense: 90 tablet; Refill: 0    2. Restless legs syndrome (RLS)  - clonazePAM (KLONOPIN) 0.25 MG disintegrating tablet [Pharmacy Med Name: clonazePAM 0.25 MG Oral Tablet Disintegrating]; DISSOLVE 1 TABLET BY MOUTH TWICE DAILY AS NEEDED  Dispense: 60 tablet; Refill: 0    If protocol passes may refill for 12 months if within 3 months of last provider visit (or a total of 15 months).                clonazePAM (KLONOPIN) 0.25 MG disintegrating tablet [Pharmacy  Med Name: clonazePAM 0.25 MG Oral Tablet Disintegrating] 60 tablet 0     Sig: DISSOLVE 1 TABLET BY MOUTH TWICE DAILY AS NEEDED       Controlled Substances Refill Protocol Failed - 5/24/2021  8:31 AM        Failed - Route all Controlled Substance Requests to Provider        Passed - Patient has controlled substance agreement in past 12 months     Encounter-Level CSA Scan Date:    There are no encounter-level csa scan date.               Passed - Visit with PCP or prescribing provider visit in past 12 months      Last office visit with prescriber/PCP: Visit date not found OR same dept: Visit date not found OR same specialty: 6/30/2020 Hayden Nair MD Last physical: 3/9/2021 Last MTM visit: Visit date not found    Next visit within 3 mo: Visit date not found  Next physical within 3 mo: Visit date not found  Prescriber OR PCP: Jose Piña MD  Last diagnosis associated with med order: 1. Dyslipidemia, goal LDL below 100  - rosuvastatin (CRESTOR) 20 MG tablet [Pharmacy Med Name: Rosuvastatin Calcium 20 MG Oral Tablet]; TAKE 1 TABLET BY MOUTH ONCE DAILY IN THE EVENING  Dispense: 90 tablet; Refill: 0    2. Restless legs syndrome (RLS)  - clonazePAM (KLONOPIN) 0.25 MG disintegrating tablet [Pharmacy Med Name: clonazePAM 0.25 MG Oral Tablet Disintegrating]; DISSOLVE 1 TABLET BY MOUTH TWICE DAILY AS NEEDED  Dispense: 60 tablet; Refill: 0

## 2021-06-17 NOTE — TELEPHONE ENCOUNTER
Controlled Substance Refill Request  Medication Name:   Requested Prescriptions     Pending Prescriptions Disp Refills     rosuvastatin (CRESTOR) 20 MG tablet [Pharmacy Med Name: Rosuvastatin Calcium 20 MG Oral Tablet] 90 tablet 0     Sig: TAKE 1 TABLET BY MOUTH ONCE DAILY IN THE EVENING     clonazePAM (KLONOPIN) 0.25 MG disintegrating tablet [Pharmacy Med Name: clonazePAM 0.25 MG Oral Tablet Disintegrating] 60 tablet 0     Sig: DISSOLVE 1 TABLET BY MOUTH TWICE DAILY AS NEEDED     Date Last Fill: 5/21/21  Requested Pharmacy: [facility]  Submit electronically to pharmacy  Controlled Substance Agreement on file:   Encounter-Level CSA Scan Date:    There are no encounter-level csa scan date.        Last office visit:  3/9/21

## 2021-06-17 NOTE — PATIENT INSTRUCTIONS - HE
Patient Instructions by Jamie Adams MD at 10/28/2019  1:00 PM     Author: Jamie Adams MD Service: -- Author Type: Physician    Filed: 10/28/2019  2:00 PM Encounter Date: 10/28/2019 Status: Addendum    : Jamie Adams MD (Physician)    Related Notes: Original Note by Jamie Adams MD (Physician) filed at 10/28/2019  1:53 PM       Patient Education     Chronic Kidney Disease (CKD)     The role of the kidneys is to remove waste products and extra water from the blood.  When the kidneys do not work as they should, waste products begin to build up in the blood. This is called chronic kidney disease (CKD). CKD means that you have kidney damage or a decrease in kidney function lasting at least 3 months. CKD allows extra water, waste, and toxins to build up in the body. This can eventually become life-threatening. You might need dialysis or a kidney transplant to stay alive. This most severe form is called end stage renal disease.  Diabetes is the leading causes of chronic renal disease. Other causes include high blood pressure, hardening of the arteries (atherosclerosis), lupus, inflammation of the blood vessels (vasculitis), and past viral or bacterial infections. Certain over-the-counter pain medicines can cause renal failure when taken often over a long period of time. These include aspirin, ibuprofen, and related anti-inflammatory medicines called NSAIDs (nonsteroidal anti-inflammatory drugs).  Home care  The following guidelines will help you care for yourself at home:    If you have diabetes, talk with your healthcare provider about keeping your blood sugar under control. Ask if you need to make and changes to your diet, lifestyle, or medicines.    If you have high blood pressure:  ? Take prescribed medicine to lower your blood pressure to the recommended goal of less than 130/80.  ? Start a regular exercise program that you enjoy. Check with your  healthcare provider to be sure your planned exercise program is right for you.  ? Eat less salt (sodium). Your healthcare provider can tell you how much salt per day is safe for you.    If you are overweight, talk with your healthcare provider about a weight loss plan.    If you smoke, you must quit. Smoking makes kidney disease worse. Talk with your healthcare provider about ways to help you quit.  For more information, visit the following links:  ? www.smokefree.gov/sites/default/files/pdf/clearing-the-air-accessible.pdf  ? www.smokefree.gov  ? www.cancer.org/healthy/stayawayfromtobacco/guidetoquittingsmoking/    Most people with CKD need to follow a special diet.  Be sure you understand yours. In general, you will need to limit protein, salt, potassium, and phosphorus. You also need to limit how much fluid you drink.     CKD is a risk factor for heart disease. Talk with your healthcare provider about any other risk factors you might have and what you can do to lessen them.    Talk with your healthcare provider about any medicines you are taking to find out if they need to be reduced or stopped.    Don't use the following over-the-counter medicines, or consult your healthcare provider before using:  ? Aspirin and NSAIDs such as ibuprofen or naproxen. Using acetaminophen for fever or pain is OK.  ? Laxatives and antacids containing magnesium or aluminum  ? Fleet or phospho soda enemas containing phosphorus  ? Certain stomach acid-blocking medicine such as cimetidine or ranitidine   ? Decongestants containing pseudoephedrine   ? Herbal supplements  Follow-up care  Follow up with your healthcare provider, or as advised. Contact one of the following for more information:    American Association of Kidney Patients 749-689-5802 www.aakp.org    National Kidney Foundation 155-239-4899 www.kidney.org    American Kidney Fund 488-043-0031 www.kidneyfund.org    National Kidney Disease Education Program 704-4KIDNEY  www.nkdep.nih.gov  If an X-ray, ECG (cardiogram), or other diagnostic test was taken, you will be told of any new findings that may affect your care.  Call 911  Call 911 if you have any of the following:    Severe weakness, dizziness, fainting, drowsiness, or confusion    Chest pain or shortness of breath    Heart beating fast, slow, or irregularly  When to seek medical advice  Call your healthcare provider right away if any of these occur:    Nausea or vomiting    Fever of 100.4 F (38 C) or higher, or as directed by your healthcare provider    Unexpected weight gain or swelling in the legs, ankles, or around the eyes    Decrease or absent urine output  Date Last Reviewed: 9/1/2016 2000-2017 [employer]. [address] [place] [address]. All rights reserved. This information is not intended as a substitute for professional medical care. Always follow your healthcare professional's instructions.         Patient Education     Flank Pain, Uncertain Cause  The flank is the area between your upper abdomen and your back. Pain there is often caused by a problem with your kidneys. It might be a kidney infection or a kidney stone. Other causes of flank pain include spinal arthritis, a pinched nerve from a back injury, or a back muscle strain or spasm.  The cause of your flank pain is not certain. You may need other tests.  Home care  Follow these tips when caring for yourself at home:    You may use acetaminophen or ibuprofen to control pain, unless your health care provider prescribed another medicine. If you have chronic liver or kidney disease, talk with your provider before taking these medicines. Also talk with your provider first if youve ever had a stomach ulcer or GI bleeding.    If the pain is coming from your muscles, you may get relief with ice or heat. During the first 2 days after the injury, put an ice pack on the painful area for 20 minutes every 2 to 4 hours. This will reduce  swelling and pain. A hot shower, hot bath, or heating pad works well for a muscle spasm. You can start with ice, then switch to heat after 2 days. You might find that alternating ice and heat works well. Use the method that feels the best to you.  Follow-up care  Follow up with your healthcare provider if your symptoms dont get better over the next few days.  When to seek medical advice  Call your healthcare provider right away if any of these happen:    Repeated vomiting    Fever of 100.4 F (38 C) or higher, or as directed by your health care provider    Flank pain that gets worse    Pain that spreads to the front of your belly (abdomen)    Dizziness, weakness, or fainting    Blood in your urine    Burning feeling when you urinate or the need to urinate often    Pain in one of your legs that gets worse    Numbness or weakness in a leg  Date Last Reviewed: 10/1/2016    8407-5393 [employer]. [address] [place] [address], [place] [address]. All rights reserved. This information is not intended as a substitute for professional medical care. Always follow your healthcare professional's instructions.

## 2021-06-17 NOTE — TELEPHONE ENCOUNTER
There was an error in sending prescription to the pharmacy. Order pended again.  Janine Carver, DORETHA

## 2021-06-17 NOTE — TELEPHONE ENCOUNTER
Controlled Substance Refill Request  Medication Name:   Requested Prescriptions     Pending Prescriptions Disp Refills     clonazePAM (KLONOPIN) 0.25 MG disintegrating tablet [Pharmacy Med Name: clonazePAM 0.25 MG Oral Tablet Disintegrating] 60 tablet 0     Sig: DISSOLVE 1 TABLET BY MOUTH TWICE DAILY AS NEEDED     Date Last Fill: 2/4/21  Requested Pharmacy: [facility]  Submit electronically to pharmacy  Controlled Substance Agreement on file:   Encounter-Level CSA Scan Date:    There are no encounter-level csa scan date.        Last office visit:  3/9/21

## 2021-06-17 NOTE — PROGRESS NOTES
Call    Your angio is abnormal    Need to see cardiology  Next week or so    Probably need angiogram

## 2021-06-18 NOTE — PATIENT INSTRUCTIONS - HE
Patient Instructions by Janine Carver CMA at 7/14/2020 11:20 AM     Author: Janine Carver CMA Service: -- Author Type: Certified Medical Assistant    Filed: 7/14/2020 12:12 PM Encounter Date: 7/14/2020 Status: Addendum    : Jose Piña MD (Physician)    Related Notes: Original Note by Janine Carver CMA (Certified Medical Assistant) filed at 7/14/2020 11:39 AM       Annual wellness visit for this 70-year-old lady, generally doing well, I saw her February 11, 2020 for preop exam before her left cataract surgery that went well.  Issues are bilateral knee osteoarthritis, right worse than left, told bone-on-bone, may be headed to a knee replacement.  Right cervical radiculopathy sensory symptoms, with right trapezius ridge pain, suggest she asked [facility] about doing physical therapy for that.  Type 2 diabetes, on 3 oral medications of metformin, glimepiride, and pioglitazone, managed by Dr. Lowery at [facility] endocrinology.  Essential hypertension, blood pressure well controlled on combination of losartan plus hydrochlorothiazide.  Muscle cramps, she has been off her potassium supplement, will check potassium and magnesium levels today.  Mild renal insufficiency with GFR 49.  Hyperlipidemia in the context of diabetes and hypertension and coronary calcifications, with elevated triglycerides related to diabetes; Dr. Lowery will be checking her lipids.  Asymptomatic coronary disease.  Overweight with body mass index of 29.6.  Restless leg syndrome, takes clonazepam for that.  History of treatment for hemochromatosis, but had satisfactory ferritin levels and normal liver chemistry test done in February 2020.  Post menopause, satisfactory DEXA bone density scan done January 2020.  Had screening colonoscopy April 2020, good for 5 years, recheck 2025.  Due for screening mammography.  Up-to-date on vaccines except tetanus booster, which I told her she can get at a community pharmacy under her  Medicare prescription drug benefit.    Bilateral knee osteoarthritis, right worse than left, told bone-on-bone, may be headed to a knee replacement.    Right cervical radiculopathy sensory symptoms, with right trapezius ridge pain, suggest she asked [facility] about doing physical therapy for that.     Type 2 diabetes, currently on 3 oral medications of metformin, glimepiride, and pioglitazone, with suboptimally controlled A1c and elevated morning blood sugars.  She is currently working with Dr. Lowery, endocrinology at [facility].  She told me that Dr. Lowery is considering having her take an evening dose of insulin.  She also needs to reduce the size of her supper.  She does not have any history of neuropathy, retinopathy, or nephropathy.  No history of heart attack or stroke either.  Lab Results   Component Value Date    HGBA1C 9.7 (H) 03/15/2018      Essential hypertension, in the context of diabetes, with good blood pressure control on losartan plus hydrochlorothiazide with potassium supplementation.   BP Readings from Last 3 Encounters:   07/14/20 112/70   06/30/20 104/65   02/11/20 107/63     Mild renal insufficiency with GFR 49.      Hyperlipidemia, in the context of diabetes and hypertension, with no history of cardiovascular events, on moderate intensity rosuvastatin.  Lab Results   Component Value Date    CHOL 152 06/08/2018    CHOL 150 06/05/2017     Lab Results   Component Value Date    HDL 46 (L) 06/08/2018     Lab Results   Component Value Date    LDLCALC 57 06/08/2018     Lab Results   Component Value Date    TRIG 246 (H) 06/08/2018     Asymptomatic coronary artery disease, with coronary calcification study performed May 1, 2018 showing mild to moderate multivessel coronary artery disease, but no severe stenoses.  She takes a baby aspirin per day, and should continue on that.     Overweight with body mass index of 29.6, needs to lose about 30 pounds.  Wt Readings from Last 3 Encounters:   07/14/20  170 lb (77.1 kg)   02/11/20 172 lb 6.4 oz (78.2 kg)   01/06/20 172 lb 1.3 oz (78.1 kg)     Restless leg syndrome, takes clonazepam for that, which controls symptoms well.    History of treatment for hemochromatosis, but had satisfactory ferritin levels and normal liver chemistry test done in February 2020.   Lab Results   Component Value Date    FERRITIN 47 02/11/2020     Lab Results   Component Value Date    ALT 20 02/11/2020    AST 13 02/11/2020    ALKPHOS 46 02/11/2020    BILITOT 0.8 02/11/2020     Low back pain, was probably a muscle strain presenting in October 2019, has resolved.    Post menopause, satisfactory DEXA bone density scan, with actually positive T-scores performed January 27, 2020. Continue with calcium supplement and weightbearing exercise    Rhinitis, for which he uses nasal steroid fluticasone    Screening colonoscopy done April 2020, she told good for 5 years    Screening mammogram is due about now, and she is going to schedule that herself.    Up-to-date on vaccines except tetanus booster, which I told her she can get at a community pharmacy under her Medicare prescription drug benefit.      Patient Education     Exercise for a Healthier Heart  You may wonder how you can improve the health of your heart. If youre thinking about exercise, youre on the right track. You dont need to become an athlete, but you do need a certain amount of brisk exercise to help strengthen your heart. If you have been diagnosed with a heart condition, your doctor may recommend exercise to help stabilize your condition. To help make exercise a habit, choose safe, fun activities.       Be sure to check with your health care provider before starting an exercise program.    Why exercise?  Exercising regularly offers many healthy rewards. It can help you do all of the following:    Improve your blood cholesterol levels to help prevent further heart trouble    Lower your blood pressure to help prevent a stroke or heart  attack    Control diabetes, or reduce your risk of getting this disease    Improve your heart and lung function    Reach and maintain a healthy weight    Make your muscles stronger and more limber so you can stay active    Prevent falls and fractures by slowing the loss of bone mass (osteoporosis)    Manage stress better  Exercise tips  Ease into your routine. Set small goals. Then build on them.  Exercise on most days. Aim for a total of 150 or more minutes of moderate to  vigorous intensity activity each week. Consider 40 minutes, 3 to 4 times a week. For best results, activity should last for 40 minutes on average. It is OK to work up to the 40 minute period over time. Examples of moderate-intensity activity is walking one mile in 15 minutes or 30 to 45 minutes of yard work.  Step up your daily activity level. Along with your exercise program, try being more active throughout the day. Walk instead of drive. Do more household tasks or yard work.  Choose one or more activities you enjoy. Walking is one of the easiest things you can do. You can also try swimming, riding a bike, or taking an exercise class.  Stop exercising and call your doctor if you:    Have chest pain or feel dizzy or lightheaded    Feel burning, tightness, pressure, or heaviness in your chest, neck, shoulders, back, or arms    Have unusual shortness of breath    Have increased joint or muscle pain    Have palpitations or an irregular heartbeat      [id number] [employer]. [address] [place] [address]. All rights reserved. This information is not intended as a substitute for professional medical care. Always follow your healthcare professional's instructions.           Advance Directive  Patients advance directive was discussed and I am comfortable with the patients wishes.  Patient Education   Personalized Prevention Plan  You are due for the preventive services outlined below.  Your care team is available to assist you  in scheduling these services.  If you have already completed any of these items, please share that information with your care team to update in your medical record.  Health Maintenance   Topic Date Due   ? HEPATITIS C SCREENING  1950   ? DIABETIC FOOT EXAM  1950   ? COLORECTAL CANCER SCREENING  03/18/1966   ? ZOSTER VACCINES (2 of 3) 11/14/2012   ? MEDICARE ANNUAL WELLNESS VISIT  03/18/2013   ? TD 18+ HE  01/01/2018   ? MAMMOGRAM  01/24/2018   ? A1C  09/15/2018   ? PNEUMOCOCCAL IMMUNIZATION 65+ HIGH/HIGHEST RISK (2 of 2 - PPSV23) 01/02/2019   ? LIPID  06/08/2019   ? MICROALBUMIN  03/28/2020   ? DIABETIC EYE EXAM  06/14/2020   ? INFLUENZA VACCINE RULE BASED (1) 08/01/2020   ? BMP  02/11/2021   ? FALL RISK ASSESSMENT  02/11/2021   ? DXA SCAN  01/27/2022   ? ADVANCE CARE PLANNING  06/05/2022

## 2021-06-19 NOTE — LETTER
Letter by Chago Bernal MD at      Author: Chago Bernal MD Service: -- Author Type: --    Filed:  Encounter Date: 3/28/2019 Status: (Other)         [facility] INTERNAL MEDICINE  03/27/19    Patient: Yumiko Peña  YOB: 1950  Medical Record Number: [id number]  CSN: [id number]                                                                              Non-opioid Controlled Substance Agreement    I understand that my care provider has prescribed a controlled substance to help manage my condition(s). I am taking this medicine to help me function or work. I know this is strong medicine, and that it can cause serious side effects. Controlled substances can be sedating, addicting and may cause a dependency on the drug. They can affect my ability to drive or think, and cause depression. They need to be taken exactly as prescribed. Combining controlled substances with certain medicines or chemicals (such as cocaine, sedatives and tranquilizers, sleeping pills, meth) can be dangerous or even fatal. Also, if I stop controlled substances suddenly, I may have severe withdrawal symptoms.  If not helpful, I may be asked to stop them.    The risks, benefits, and side effects of these medicine(s) were explained to me. I agree that:    1. I will take part in other treatments as advised by my care team. This may be psychiatry or counseling, physical therapy, behavioral therapy, group treatment or a referral to a pain clinic. I will reduce or stop my medicine when my care team tells me to do so.  2. I will take my medicines as prescribed. I will not change the dose or schedule unless my care team tells me to. There will be no refills if I run out early.  I may be contactedwithout warning and asked to complete a urine drug test or pill count at any time.   3. I will keep all my appointments, and understand this is part of the monitoring of controlled substances. My care team may require an office  visit for EVERY controlled substance refill. If I miss appointments or dont follow instructions, my care team may stop my medicine.  4. I will not ask other providers to prescribe controlled substances, and I will not accept controlled substances from other people. If I need another prescribed controlled substance for a new reason, I will tell my care team within 1 business day.  5. I will use one pharmacy to fill all of my controlled substance prescriptions, and it is up to me to make sure that I do not run out of my medicines on weekends or holidays. If my care team is willing to refill my controlled substance prescription without a visit, I must request refills only during office hours, refills may take up to 3 days to process, and it may take up to 5 to 7 days for my medicine to be mailed and ready at my pharmacy. Prescriptions will not be mailed anywhere except my pharmacy.    6. I am responsible for my prescriptions. If the medicine/prescription is lost or stolen, it will not be replaced. I also agree not to share controlled substance medicines with anyone.          [facility] INTERNAL MEDICINE  03/27/19  Patient:  Yumiko Peña  YOB: 1950  Medical Record Number: [id number]  CSN: [id number]    7. I agree to not use ANY illegal or recreational drugs. This includes marijuana, cocaine, bath salts or other drugs. I agree not to use alcohol unless my care team says I may. I agree to give urine samples whenever asked. If I dont give a urine sample, the care team may stop my medicine.    8. If I enroll in the [place] Medical Marijuana program, I will tell my care team. I will also sign an agreement to share my medical records with my care team.    9. I will bring in my list of medicines (or my medicine bottles) each time I come to the clinic.   10. I will tell my care team right away if I become pregnant or have a new medical problem treated outside of my regular clinic.  11. I  understand that this medicine can affect my thinking and judgment. It may be unsafe for me to drive, use machinery and do dangerous tasks. I will not do any of these things until I know how the medicine affects me. If my dose changes, I will wait to see how it affects me. I will contact my care team if I have concerns about medicine side effects.    I understand that if I do not follow any of the conditions above, my prescriptions or treatment may be stopped.      I agree that my provider, clinic care team, and pharmacy may work with any city, state or federal law enforcement agency that investigates the misuse, sale, or other diversion of my controlled medicine. I will allow my provider to discuss my care with or share a copy of this agreement with any other treating provider, pharmacy or emergency room where I receive care. I agree to give up (waive) any right of privacy or confidentiality with respect to these consents.   I have read this agreement and have asked questions about anything I did not understand.    ___________________________________________________________________________  Patient signature - Date/Time  -Yumiko Peña                                      ___________________________________________________________________________  Witness signature                                                                    ___________________________________________________________________________  Provider signature- Chago Bernal MD

## 2021-06-19 NOTE — LETTER
Letter by Chago Bernal MD at      Author: Chago Bernal MD Service: -- Author Type: --    Filed:  Encounter Date: 3/29/2019 Status: (Other)         Yumiko Peña  [address] [facility] [address]             March 29, 2019         Dear Ms. Peña,    Below are the results from your recent visit:    Resulted Orders   Iron and Transferrin Iron Binding Capacity   Result Value Ref Range    Iron 159 42 - 175 ug/dL    Transferrin 273 212 - 360 mg/dL    Transferrin Saturation, Calculated 46 20 - 50 %    Transferrin IBC, Calculated 342 313 - 563 ug/dL   Ferritin   Result Value Ref Range    Ferritin 48 10 - 130 ng/mL   Basic Metabolic Panel   Result Value Ref Range    Sodium 140 136 - 145 mmol/L    Potassium 4.5 3.5 - 5.0 mmol/L    Chloride 102 98 - 107 mmol/L    CO2 26 22 - 31 mmol/L    Anion Gap, Calculation 12 5 - 18 mmol/L    Glucose 292 (H) 70 - 125 mg/dL    Calcium 10.5 8.5 - 10.5 mg/dL    BUN 24 8 - 28 mg/dL    Creatinine 1.11 (H) 0.60 - 1.10 mg/dL    GFR MDRD Af Amer 59 (L) >60 mL/min/1.73m2    GFR MDRD Non Af Amer 48 (L) >60 mL/min/1.73m2    Narrative    Fasting Glucose reference range is 70-99 mg/dL per  American Diabetes Association (ADA) guidelines.   Erythrocyte Sedimentation Rate   Result Value Ref Range    Sed Rate 8 0 - 20 mm/hr   Urinalysis-UC if Indicated   Result Value Ref Range    Color, UA Yellow Colorless, Yellow, Straw, Light Yellow    Clarity, UA Clear Clear    Glucose,  mg/dL (!) Negative    Bilirubin, UA Negative Negative    Ketones, UA Trace (!) Negative    Specific Gravity, UA 1.020 1.005 - 1.030    Blood, UA Negative Negative    pH, UA 5.5 5.0 - 8.0    Protein, UA Negative Negative mg/dL    Urobilinogen, UA 0.2 E.U./dL 0.2 E.U./dL, 1.0 E.U./dL    Nitrite, UA Negative Negative    Leukocytes, UA Trace (!) Negative    Bacteria, UA Few (!) None Seen hpf    RBC, UA None Seen None Seen, 0-2 hpf    WBC, UA 0-5 None Seen, 0-5 hpf    Squam Epithel, UA 0-5 None Seen,  0-5 lpf    Narrative    Urine Culture ordered based on [facility] criteria   Thyroid Cascade   Result Value Ref Range    TSH 1.18 0.30 - 5.00 uIU/mL   Hepatic Profile   Result Value Ref Range    Bilirubin, Total 0.7 0.0 - 1.0 mg/dL    Bilirubin, Direct 0.2 <=0.5 mg/dL    Protein, Total 7.9 6.0 - 8.0 g/dL    Albumin 4.6 3.5 - 5.0 g/dL    Alkaline Phosphatase 50 45 - 120 U/L    AST 12 0 - 40 U/L    ALT 20 0 - 45 U/L   HM1 (CBC with Diff)   Result Value Ref Range    WBC 5.9 4.0 - 11.0 thou/uL    RBC 4.71 3.80 - 5.40 mill/uL    Hemoglobin 15.6 12.0 - 16.0 g/dL    Hematocrit 44.3 35.0 - 47.0 %    MCV 94 80 - 100 fL    MCH 33.1 27.0 - 34.0 pg    MCHC 35.2 32.0 - 36.0 g/dL    RDW 11.5 11.0 - 14.5 %    Platelets 236 140 - 440 thou/uL    MPV 8.5 7.0 - 10.0 fL    Neutrophils % 52 50 - 70 %    Lymphocytes % 37 20 - 40 %    Monocytes % 8 2 - 10 %    Eosinophils % 3 0 - 6 %    Basophils % 1 0 - 2 %    Neutrophils Absolute 3.1 2.0 - 7.7 thou/uL    Lymphocytes Absolute 2.2 0.8 - 4.4 thou/uL    Monocytes Absolute 0.5 0.0 - 0.9 thou/uL    Eosinophils Absolute 0.2 0.0 - 0.4 thou/uL    Basophils Absolute 0.1 0.0 - 0.2 thou/uL   Microalbumin, Random Urine   Result Value Ref Range    Microalbumin, Random Urine 1.26 0.00 - 1.99 mg/dL    Creatinine, Urine 89.5 mg/dL    Microalbumin/Creatinine Ratio Random Urine 14.1 <=19.9 mg/g    Narrative    Microalbumin, Random Urine  <2.0 mg/dL . . . . . . . . Normal  3.0-30.0 mg/dL . . . . . . Microalbuminuria  >30.0 mg/dL . . . . . .  . Clinical Proteinuria    Microalbumin/Creatinine Ratio, Random Urine  <20 mg/g . . . . .. . . . Normal   mg/g . . . . . . . Microalbuminuria  >300 mg/g . . . . . . . . Clinical Proteinuria         Lab Results   Component Value Date    HGBA1C 9.7 (H) 03/15/2018       Hui I am very pleased with your lab.  It looks very good.  The iron studies are very normal.    The specimen was nonfasting.  Your blood sugar was high.  Dr. Lowery will address  this at your next diabetic visit.    Please call with questions or contact us using [employer].    Sincerely,        Electronically signed by Chago Bernal MD

## 2021-06-20 NOTE — LETTER
Letter by Jose Piña MD at      Author: Jose Piña MD Service: -- Author Type: --    Filed:  Encounter Date: 7/15/2020 Status: (Other)         Yumiko Peña  [address] [address] [facility] [address]             July 15, 2020         Dear Ms. Peña,    Below are the results from your recent visit: Your magnesium level came back low, which could be contributing to muscle cramps.  I transmitted a prescription to your [facility] for you to start taking a magnesium tablet daily.  Your potassium concentration actually came back okay.    Resulted Orders   Basic Metabolic Panel   Result Value Ref Range    Sodium 139 136 - 145 mmol/L    Potassium 4.2 3.5 - 5.0 mmol/L    Chloride 101 98 - 107 mmol/L    CO2 27 22 - 31 mmol/L    Anion Gap, Calculation 11 5 - 18 mmol/L    Glucose 175 (H) 70 - 125 mg/dL    Calcium 9.7 8.5 - 10.5 mg/dL    BUN 21 8 - 28 mg/dL    Creatinine 0.96 0.60 - 1.10 mg/dL    GFR MDRD Af Amer >60 >60 mL/min/1.73m2    GFR MDRD Non Af Amer 57 (L) >60 mL/min/1.73m2    Narrative    Fasting Glucose reference range is 70-99 mg/dL per  American Diabetes Association (ADA) guidelines.   Magnesium   Result Value Ref Range    Magnesium 1.5 (L) 1.8 - 2.6 mg/dL           Please call with questions or contact us using [employer].    Sincerely,        Electronically signed by Jose Piña MD

## 2021-06-20 NOTE — LETTER
Letter by Jamie Adams MD at      Author: Jamie Adams MD Service: -- Author Type: --    Filed:  Encounter Date: 2/7/2020 Status: (Other)         Yumiko Peña  [address]  [facility] [place] [address]     February 7, 2020     Dear Ms. Peña,    Below are the results from your recent visit:    Resulted Orders   DXA Bone Density Scan    Narrative    1/27/2020      RE: Yumiko Peña  YOB: 1950        Dear Jamie Adams,    Patient Profile:  69 y.o. female, postmenopausal, is here for the first bone density test.   History of fractures - None. Family history of osteoporosis - None.    Family history of hip fracture: None. Smoking history - No. Osteoporosis   treatment past -  Yes;  HRT. Osteoporosis treatment current - No.  Chronic   medical problems - Diabetes Mellitus. High risk medications -  None.      Assessment:    1. The spine bone density L1-L4(L3) with T-score 1.8.  2. Femoral bone densities show left femoral neck T- score 1.8 and right   femoral neck T-score 2.0.  3. Trabecular bone score indicates good trabecular bone architecture.      69 y.o. female with NORMAL BONE DENSITY.    Recommendations:  Appropriate calcium and vitamin D supplements recommended with follow up   bone density scan in 3 years.  You can continue to take your multivitamin that includes calcium.  For vitamin D, you can start taking 1000 units vitamin D3 daily.  This is over-the-counter.    Bone densitometry was performed on your patient using our easyfolio   densitometer. The results are summarized and a copy of the actual scans   are included for your review. In conformity with the International Society   of Clinical Densitometry's most recent position statement for DXA   interpretation (2015), the diagnosis will be made on the lowest measured   T-score of the lumbar spine, femoral neck, total proximal femur or 33%   radius. Note the change in terminology  for diagnostic classification from   OSTEOPENIA to LOW BONE MASS. All trending for sequential exams will be   done using multiple vertebrae or the total proximal femur. Fracture risk   is based on the WHO Fracture Risk Assessment Tool (FRAX). If additional   information is needed or if you would like to discuss the results, please   do not hesitate to call me.       Thank you for referring this patient to [facility].   We are happy to be of service in support of you and your practice. If you   have any questions or suggestions to improve our service, please call me   at [phone].     Sincerely,     Luis F Horn M.D. C.C.D.  Osteoporosis Services, [facility]       Please call with questions or contact us using [employer].    Sincerely,        Electronically signed by Jamie Adams MD

## 2021-06-20 NOTE — LETTER
Letter by Jose Piña MD at      Author: Jose Piña MD Service: -- Author Type: --    Filed:  Encounter Date: 2/12/2020 Status: (Other)         Yumiko Peña  [address] [facility] [address]             February 12, 2020         Dear Ms. Peña,    Below are the results from your recent visit:    Resulted Orders   HM2(CBC w/o Differential)   Result Value Ref Range    WBC 6.4 4.0 - 11.0 thou/uL    RBC 4.46 3.80 - 5.40 mill/uL    Hemoglobin 14.7 12.0 - 16.0 g/dL    Hematocrit 44.3 35.0 - 47.0 %    MCV 99 80 - 100 fL    MCH 33.1 27.0 - 34.0 pg    MCHC 33.3 32.0 - 36.0 g/dL    RDW 11.7 11.0 - 14.5 %    Platelets 258 140 - 440 thou/uL    MPV 7.8 7.0 - 10.0 fL   Ferritin   Result Value Ref Range    Ferritin 47 10 - 130 ng/mL   Comprehensive Metabolic Panel   Result Value Ref Range    Sodium 140 136 - 145 mmol/L    Potassium 4.6 3.5 - 5.0 mmol/L    Chloride 101 98 - 107 mmol/L    CO2 28 22 - 31 mmol/L    Anion Gap, Calculation 11 5 - 18 mmol/L    Glucose 165 (H) 70 - 125 mg/dL    BUN 21 8 - 28 mg/dL    Creatinine 1.09 0.60 - 1.10 mg/dL    GFR MDRD Af Amer 60 (L) >60 mL/min/1.73m2    GFR MDRD Non Af Amer 49 (L) >60 mL/min/1.73m2    Bilirubin, Total 0.8 0.0 - 1.0 mg/dL    Calcium 10.1 8.5 - 10.5 mg/dL    Protein, Total 7.1 6.0 - 8.0 g/dL    Albumin 4.3 3.5 - 5.0 g/dL    Alkaline Phosphatase 46 45 - 120 U/L    AST 13 0 - 40 U/L    ALT 20 0 - 45 U/L    Narrative    Fasting Glucose reference range is 70-99 mg/dL per  American Diabetes Association (ADA) guidelines.       Enclosed are your test results from your recent clinic visit with me.  The ferritin level came back normal.  I do not believe you have any laboratory evidence of iron overload at the present time.  Glucose was a bit elevated 165, no surprise.  Kidney function holding stable.  Blood cell counts normal.     Please call with questions or contact us using MyChart.    Sincerely,        Electronically signed by Jose Diaz  MD Wang

## 2021-06-23 NOTE — TELEPHONE ENCOUNTER
Ally Woosd for refill requested below?  Refill has been set up in a separate encounter for you to review.    Prescription Monitoring Program activity reviewed with no discrepancies noted.  Last fill per : 01/09/19    Controlled Substance Agreement on file: No     Last office visit with provider:  3/15/2018 Chago Bernal MD    Please advise.    [place]yenne NATARAJAN CMA/SHARONDA....................1:40 PM

## 2021-06-23 NOTE — TELEPHONE ENCOUNTER
Patient stated she has enough until Monday and hopes this refilled by Monday AM. Patient was educated with the 72 business hour turn around time for refills. Patient was not aware of this.    Controlled Substance Refill Request  Medication Name:   Requested Prescriptions     Pending Prescriptions Disp Refills     clonazePAM (KLONOPIN) 0.25 MG disintegrating tablet [Pharmacy Med Name: CLONAZEPAM 0.25MG   ODT] 30 tablet 1     Sig: DISSOLVE 1 TABLET IN MOUTH TWICE DAILY AS NEEDED FOR ANXIETY     Date Last Fill: 12/26/18  Pharmacy: [facility] [place] [facility]      Submit electronically to pharmacy  Controlled Substance Agreement Date Scanned:   Encounter-Level CSA Scan Date:    There are no encounter-level csa scan date.       Last office visit with prescriber/PCP: 3/15/2018 Chago Bernal MD OR same dept: 3/15/2018 Chago Bernal MD OR same specialty: 3/15/2018 Chago Bernal MD  Last physical: Visit date not found Last MTM visit: Visit date not found

## 2021-06-24 NOTE — TELEPHONE ENCOUNTER
Controlled Substance Refill Request  Medication:   Requested Prescriptions     Pending Prescriptions Disp Refills     clonazePAM (KLONOPIN) 0.25 MG disintegrating tablet [Pharmacy Med Name: CLONAZEPAM 0.25MG   ODT] 10 tablet 0     Sig: DISSOLVE 1 TABLET IN MOUTH TWICE DAILY AS NEEDED FOR ANXIETY     Date Last Fill: 3/1/19  Pharmacy: [facility]   Submit electronically to pharmacy    Controlled Substance Agreement on File:   Encounter-Level CSA Scan Date:    There are no encounter-level csa scan date.       Last office visit with primary: Visit date not found

## 2021-06-24 NOTE — TELEPHONE ENCOUNTER
Due to be seen and lab    Rx renewed per Medication Renewal Policy. Medication was last renewed on 6/5/17. 8/15/18    Ema Cast, [facility] Triage/Med Refill 2/26/2019     Requested Prescriptions   Pending Prescriptions Disp Refills     rosuvastatin (CRESTOR) 20 MG tablet [Pharmacy Med Name: ROSUVASTATIN 20MG TAB] 90 tablet 1     Sig: TAKE 1 TABLET BY MOUTH ONCE DAILY IN THE EVENING    Statins Refill Protocol (Hmg CoA Reductase Inhibitors) Passed - 2/25/2019  2:13 PM       Passed - PCP or prescribing provider visit in past 12 months     Last office visit with prescriber/PCP: 3/15/2018 Chago Bernal MD OR same dept: 3/15/2018 Chago Bernal MD OR same specialty: 3/15/2018 Chago Bernal MD  Last physical: Visit date not found Last MTM visit: Visit date not found   Next visit within 3 mo: Visit date not found  Next physical within 3 mo: Visit date not found  Prescriber OR PCP: Chago Bernal MD  Last diagnosis associated with med order: 1. Dyslipidemia, goal LDL below 100  - rosuvastatin (CRESTOR) 20 MG tablet [Pharmacy Med Name: ROSUVASTATIN 20MG TAB]; TAKE 1 TABLET BY MOUTH ONCE DAILY IN THE EVENING  Dispense: 90 tablet; Refill: 1    2. Hypertension  - potassium chloride (KLOR-CON) 10 MEQ CR tablet [Pharmacy Med Name: POTA CHLORIDE ER 10MEQ TAB]; TAKE 1 TABLET BY MOUTH ONCE DAILY  Dispense: 90 tablet; Refill: 1    If protocol passes may refill for 12 months if within 3 months of last provider visit (or a total of 15 months).             potassium chloride (KLOR-CON) 10 MEQ CR tablet [Pharmacy Med Name: POTA CHLORIDE ER 10MEQ TAB] 90 tablet 1     Sig: TAKE 1 TABLET BY MOUTH ONCE DAILY    Potassium Supplements Refill Protocol Passed - 2/25/2019  2:13 PM       Passed - PCP or prescribing provider visit in past 12 months      Last office visit with prescriber/PCP: 3/15/2018 Chago Bernal MD OR same dept: 3/15/2018 Chago Bernal MD OR same specialty: 3/15/2018 Chago Bernal MD  Last physical:  Visit date not found Last MTM visit: Visit date not found   Next visit within 3 mo: Visit date not found  Next physical within 3 mo: Visit date not found  Prescriber OR PCP: Chago Bernal MD  Last diagnosis associated with med order: 1. Dyslipidemia, goal LDL below 100  - rosuvastatin (CRESTOR) 20 MG tablet [Pharmacy Med Name: ROSUVASTATIN 20MG TAB]; TAKE 1 TABLET BY MOUTH ONCE DAILY IN THE EVENING  Dispense: 90 tablet; Refill: 1    2. Hypertension  - potassium chloride (KLOR-CON) 10 MEQ CR tablet [Pharmacy Med Name: POTA CHLORIDE ER 10MEQ TAB]; TAKE 1 TABLET BY MOUTH ONCE DAILY  Dispense: 90 tablet; Refill: 1    If protocol passes may refill for 12 months if within 3 months of last provider visit (or a total of 15 months).            Passed - Potassium level in last 12 months    Lab Results   Component Value Date    Potassium 4.1 03/15/2018

## 2021-06-24 NOTE — TELEPHONE ENCOUNTER
Controlled Substance Refill Request  Medication:   Requested Prescriptions     Pending Prescriptions Disp Refills     clonazePAM (KLONOPIN) 0.25 MG disintegrating tablet [Pharmacy Med Name: CLONAZEPAM 0.25MG   ODT] 30 tablet 1     Sig: DISSOLVE 1 TABLET IN MOUTH TWICE DAILY AS NEEDED FOR ANXIETY     Date Last Fill: 1/25/19  [id number]   Submit electronically to pharmacy  Controlled Substance Agreement on File:   Encounter-Level CSA Scan Date:    There are no encounter-level csa scan date.       Last office visit: Last office visit pertaining to requested medication was 3/15/18.

## 2021-06-24 NOTE — TELEPHONE ENCOUNTER
Prescription Monitoring Program activity reviewed with no discrepancies noted.  Last fill per : 2/11/19 # 30 for 15 days    Controlled Substance Agreement on file: No  Date:     Last office visit with provider:  3/15/2018 Chago Bernal MD    Please advise.  Ann Lacy CMA ([facility])

## 2021-06-25 NOTE — TELEPHONE ENCOUNTER
Prescription Monitoring Program activity reviewed with no discrepancies noted.      Last fill per : 3/8/19  Quantity/days supply: 10/5    Controlled Substance Agreement on file: No  Date:     Last office visit with provider:  3/15/2018 Chago Bernal MD    Please advise.

## 2021-06-25 NOTE — TELEPHONE ENCOUNTER
Controlled Substance Refill Request  Medication:   Requested Prescriptions     Pending Prescriptions Disp Refills     clonazePAM (KLONOPIN) 0.25 MG disintegrating tablet [Pharmacy Med Name: CLONAZEPAM 0.25MG   ODT] 10 tablet 0     Sig: DISSOLVE 1 TABLET IN MOUTH TWICE DAILY AS NEEDED FOR ANXIETY     Date Last Fill: 3/8/19  Pharmacy: [facility]    Submit electronically to pharmacy  Controlled Substance Agreement on File:   Encounter-Level CSA Scan Date:    There are no encounter-level csa scan date.       Last office visit: Last office visit pertaining to requested medication was 11/14/18.

## 2021-06-25 NOTE — TELEPHONE ENCOUNTER
Who is calling:  Patient  Reason for Call:  Patient is asking if her refill can be sent today. Patient is also asking if she can have a quantity of 30 tablets again. Patient has an appointment next week.  Date of last appointment with primary care: 11/14/18  Okay to leave a detailed message: Yes, [phone]

## 2021-06-26 ENCOUNTER — HEALTH MAINTENANCE LETTER (OUTPATIENT)
Age: 71
End: 2021-06-26

## 2021-06-26 NOTE — PROGRESS NOTES
Progress Notes by Justin Mariano MD at 4/17/2018  1:30 PM     Author: Justin Mariano MD Service: -- Author Type: Physician    Filed: 4/17/2018  1:58 PM Encounter Date: 4/17/2018 Status: Signed    : Justin Mariano MD (Physician)           Click to link to [facility] HEART CARE NOTE    Thank you, Dr. Bernal, for asking me to see Yumiko Peña in consultation at [facility] to evaluate dyspnea on exertion and abnormal nuclear stress test.      Assessment/Plan:   1.  Dyspnea on exertion and abnormal nuclear stress test: The patient's symptoms read not typical angina.  Her symptoms was improved with management of anxiety.  The patient does have a multiple risk of factors including long-term of type 2 diabetes, essential hypertension, hyperlipidemia, 25 years of smoking history and also family history of coronary artery disease.  Nuclear stress test was reported a small size of inducible myocardial ischemia and also transient ischemic dilation indicating possible multiple vessel disease.  Discussed before the evaluation including coronary angiogram with possible PCI versus coronary CT angiogram.  Because the patient has no chest pain, small size of inducible myocardial ischemia, coronary CT angiogram is recommended.  The patient and her  agreed.    2.  Essential hypertension: Her blood pressure has been controlled.  Continue losartan hydrochlorothiazide.    3.  Dyslipidemia: Continue Crestor 20 mg at bedtime.    4.  Type 2 diabetes: No change her home medications.    We will follow-up on her coronary CT angiogram report and discussed the findings with the patient.  Thank you for the opportunity to be involved in the care of Yumiko Peña. If you have any questions, please feel free to contact me.  I will see the patient again in 4 weeks.    Much or all of the text in this note was generated through the use of Dragon Dictate voice-to-text software. Errors in spelling  or words which seem out of context are unintentional.   Sound alike errors, in particular, may have escaped editing.       History of Present Illness:   It is my pleasure to see Yumiko Peña at the [facility] clinic for evaluation of Consult. Yumiko Peña is a 68 y.o. female with a medical history of type 2 diabetes for 20 years, essential hypertension, dyslipidemia and a family history of for coronary artery disease.    The patient developed dyspnea on exertion over last few weeks, which has been gradually getting worse.  The patient had a loss of her family stress.  The patient was treated with clonazepam and then her symptoms were improved.  She has no chest pain, dizziness, orthopnea, PND or leg edema.  She has occasional palpitations.  Her blood pressure and heart rate are controlled.    Her nuclear stress test was reported a small size of inducible myocardial ischemia in distal anterior segment.  There was a transient ischemic dilation with the ratio of 1.43.    Past Medical History:     Patient Active Problem List   Diagnosis   ? Metabolic syndrome   ? Hemochromatosis   ? Postmenopausal       Past Surgical History:     Past Surgical History:   Procedure Laterality Date   ? KNEE ARTHROSCOPY  2000   ? TUBAL LIGATION     ? vocal cord polyp         Family History:     Family History   Problem Relation Age of Onset   ? Diabetes Mother    ? Hyperlipidemia Mother    ? Cancer Father      abdominal   ? Hypertension Sister    ? Hyperlipidemia Sister    ? Hemochromatosis         Social History:    reports that she quit smoking about 22 years ago. She has never used smokeless tobacco. She reports that she does not drink alcohol or use illicit drugs.    Review of Systems:   General: WNL  Eyes: WNL  Ears/Nose/Throat: WNL  Lungs: WNL  Heart: Shortness of Breath with activity  Stomach: WNL  Bladder: Frequent Urination at Night  Muscle/Joints: Muscle Weakness  Skin: WNL  Nervous System: Daytime  "Sleepiness  Mental Health: Anxiety     Blood: WNL    Meds:     Current Outpatient Prescriptions:   ?  ascorbic acid (ASCORBIC ACID WITH JACQUES HIPS) 500 MG tablet, Take 500 mg by mouth daily., Disp: , Rfl:   ?  aspirin 81 MG EC tablet, Take 81 mg by mouth daily., Disp: , Rfl:   ?  BIOTIN ORAL, Take by mouth daily., Disp: , Rfl:   ?  CALCIUM-MAGNESIUM-ZINC ORAL, Take by mouth daily., Disp: , Rfl:   ?  clonazePAM (KLONOPIN) 0.25 MG disintegrating tablet, Take 1 tablet (0.25 mg total) by mouth 2 (two) times a day as needed for anxiety., Disp: 30 tablet, Rfl: 1  ?  clotrimazole (LOTRIMIN) 1 % cream, Apply topically 2 (two) times a day., Disp: 30 g, Rfl: 0  ?  fluticasone (FLONASE) 50 mcg/actuation nasal spray, 1 spray into each nostril 2 (two) times a day as needed., Disp: , Rfl:   ?  GLIMEPIRIDE ORAL, Take 4 mg by mouth daily. Does not know her current dose, Disp: , Rfl:   ?  losartan-hydrochlorothiazide (HYZAAR) 100-25 mg per tablet, TAKE ONE TABLET BY MOUTH ONCE DAILY, Disp: 90 tablet, Rfl: 3  ?  METFORMIN HCL (METFORMIN ORAL), Take 500 mg by mouth 4 (four) times a day.  4 at [facility] , Disp: , Rfl:   ?  potassium chloride (KLOR-CON) 10 MEQ CR tablet, TAKE ONE TABLET BY MOUTH ONCE DAILY, Disp: 90 tablet, Rfl: 3  ?  rosuvastatin (CRESTOR) 20 MG tablet, Take 1 tablet (20 mg total) by mouth every evening., Disp: 90 tablet, Rfl: 3  ?  furosemide (LASIX) 20 MG tablet, Take 1 tablet (20 mg total) by mouth as needed. Edema, Disp: 30 tablet, Rfl: 11     Allergies:   Lipitor [atorvastatin]    Objective:      Physical Exam  175 lb (79.4 kg)  5' 4\" (1.626 m)  Body mass index is 30.04 kg/(m^2).  /70 (Patient Site: Left Arm, Patient Position: Sitting, Cuff Size: Adult Regular)  Pulse 68  Resp 18  Ht 5' 4\" (1.626 m)  Wt 175 lb (79.4 kg)  LMP  (LMP Unknown)  BMI 30.04 kg/m2    General Appearance:   Awake, Alert, No acute distress.   HEENT:  Pupil equal, reactive to light. No scleral icterus; the mucous membranes were moist. " No oral ulcers or thrush.    Neck: No cervical bruits. No JVD. No thyromegaly. No lymph node enlargement or tenderness.   Chest: The spine was straight. The chest was symmetric.   Lungs:   Respirations unlabored. Lungs are clear to auscultation. No crackles. No wheezing.   Cardiovascular:   RRR, normal first and second heart sounds with no murmurs. No rubs or gallops.    Abdomen:  Soft. No tenderness. Non-distended. Bowels sounds are present   Extremities: Equal posterior tibial pulses. No leg edema.   Skin: No rashes or ulcers. Warm, Dry.   Musculoskeletal: No tenderness. No deformity.   Neurologic: Mood and affect are appropriate. No focal deficits.         ECG on 4-9-2018:  Sinus rhythm with PAC      Cardiac Imaging Studies  Nuclear stress test on 4-9-2018:    The exercise nuclear stress test is abnormal.    There is a small area of ischemia in the distal anterior segment(s) of the left ventricle.    Increased stress to rest cavity ratio of 1.43 is consistent with diffuse subendocardial ischemia.    The left ventricular ejection fraction is 70%.    There is no prior study available.    The patient is at an intermediate risk of future cardiac ischemic events.        Lab Review   Lab Results   Component Value Date     03/15/2018    K 4.1 03/15/2018     03/15/2018    CO2 21 (L) 03/15/2018    BUN 25 (H) 03/15/2018    CREATININE 1.20 (H) 03/15/2018    CALCIUM 10.3 03/15/2018     Lab Results   Component Value Date    WBC 6.6 06/05/2017    HGB 14.6 06/05/2017    HCT 42.2 06/05/2017    MCV 95 06/05/2017     06/05/2017     Lab Results   Component Value Date    CHOL 150 06/05/2017     No results found for: TROPONINI  Lab Results   Component Value Date    BNP 15 03/15/2018     Lab Results   Component Value Date    TSH 1.26 03/15/2018

## 2021-06-26 NOTE — PROGRESS NOTES
Progress Notes by Justin Mariano MD at 5/24/2018  9:30 AM     Author: Justin Mariano MD Service: -- Author Type: Physician    Filed: 5/24/2018 11:07 AM Encounter Date: 5/24/2018 Status: Signed    : Justin Mariano MD (Physician)           Click to link to [facility]     [facility] HEART CARE NOTE       Assessment/Plan:   1.  Coronary artery disease, nonobstructive lesions: The patient's symptoms were resolved.  She had no dyspnea on exertion or chest pain.  The patient states that she has been doing fine at this time.  She only has occasional irregular heart beat feeling.    We discussed coronary CT angiogram of findings.  The patient had total coronary calcium score 359, mild disease in LAD and RCA, moderate to stenosis in the mid left circumflex.  We discussed management of coronary artery disease especially emphasized risk of factor control.  The patient will call me back if she developed dyspnea on exertion and chest pain.    2.  Essential hypertension: Her blood pressure has been controlled.  Continue losartan-hydrochlorothiazide.    3.  Dyslipidemia: Continue Crestor 20 mg at bedtime.  Repeat lipid profile and liver function today.  If her LDL is not well controlled, consider to increase the dosage of Crestor.    4.  Type 2 diabetes: No change her home medications.    Total 40 minutes were spent in this visit for face to face visit, physical exam, review of current labs/imaging studies, plan for ongoing treatment with >50% spent on counseling and coordination of care as documented in the above mentioned note.    Thank you for the opportunity to be involved in the care of Yumiko Peña. If you have any questions, please feel free to contact me.  I will see the patient again in 6 months.    Much or all of the text in this note was generated through the use of Dragon Dictate voice-to-text software. Errors in spelling or words which seem out of context are unintentional.   Sound alike errors, in  particular, may have escaped editing.       History of Present Illness:   It is my pleasure to see Yumiko ePña at the [facility] clinic for routine cardiology follow-up and discussing coronary CT angiogram report. Yumiko Peña is a 68 y.o. female with a medical history of type 2 diabetes for 20 years, essential hypertension, dyslipidemia and a family history of for coronary artery disease.    The patient states that her dyspnea on exertion was resolved with management of anxiety.  She had no chest pain, dizziness, orthopnea, PND or leg edema.  She has occasional palpitations.  Her blood pressure and heart rate are controlled.      Past Medical History:     Patient Active Problem List   Diagnosis   ? Diabetes   ? Essential hypertension   ? Metabolic syndrome   ? Dyslipidemia, goal LDL below 100   ? Hemochromatosis   ? Postmenopausal   ? Abnormal nuclear stress test   ? Dyspnea on exertion       Past Surgical History:     Past Surgical History:   Procedure Laterality Date   ? KNEE ARTHROSCOPY  2000   ? TUBAL LIGATION     ? vocal cord polyp         Family History:     Family History   Problem Relation Age of Onset   ? Diabetes Mother    ? Hyperlipidemia Mother    ? Cancer Father      abdominal   ? Hypertension Sister    ? Hyperlipidemia Sister    ? Hemochromatosis          Social History:    reports that she quit smoking about 22 years ago. She has never used smokeless tobacco. She reports that she does not drink alcohol or use illicit drugs.    Review of Systems:   General: WNL  Eyes: WNL  Ears/Nose/Throat: WNL  Lungs: WNL  Heart: WNL  Stomach: WNL  Bladder: WNL  Muscle/Joints: WNL  Skin: WNL  Nervous System: WNL  Mental Health: WNL     Blood: WNL    Meds:     Current Outpatient Prescriptions:   ?  ascorbic acid (ASCORBIC ACID WITH JACQUES HIPS) 500 MG tablet, Take 500 mg by mouth daily., Disp: , Rfl:   ?  aspirin 81 MG EC tablet, Take 81 mg by mouth daily., Disp: , Rfl:   ?  BIOTIN ORAL, Take by  "mouth daily., Disp: , Rfl:   ?  CALCIUM-MAGNESIUM-ZINC ORAL, Take by mouth daily., Disp: , Rfl:   ?  clonazePAM (KLONOPIN) 0.25 MG disintegrating tablet, DISSOLVE 1 TABLET IN MOUTH TWICE DAILY AS NEEDED FOR ANXIETY, Disp: 30 tablet, Rfl: 1  ?  clotrimazole (LOTRIMIN) 1 % cream, Apply topically 2 (two) times a day., Disp: 30 g, Rfl: 0  ?  fluticasone (FLONASE) 50 mcg/actuation nasal spray, 1 spray into each nostril 2 (two) times a day as needed., Disp: , Rfl:   ?  furosemide (LASIX) 20 MG tablet, Take 1 tablet (20 mg total) by mouth as needed. Edema, Disp: 30 tablet, Rfl: 11  ?  GLIMEPIRIDE ORAL, Take 4 mg by mouth daily. Does not know her current dose, Disp: , Rfl:   ?  ibuprofen (ADVIL,MOTRIN) 200 MG tablet, Take 200 mg by mouth every 6 (six) hours as needed for pain., Disp: , Rfl:   ?  losartan-hydrochlorothiazide (HYZAAR) 100-25 mg per tablet, TAKE ONE TABLET BY MOUTH ONCE DAILY, Disp: 90 tablet, Rfl: 3  ?  METFORMIN HCL (METFORMIN ORAL), Take 500 mg by mouth 4 (four) times a day.  4 at [facility] , Disp: , Rfl:   ?  potassium chloride (KLOR-CON) 10 MEQ CR tablet, TAKE ONE TABLET BY MOUTH ONCE DAILY, Disp: 90 tablet, Rfl: 3  ?  rosuvastatin (CRESTOR) 20 MG tablet, Take 1 tablet (20 mg total) by mouth every evening., Disp: 90 tablet, Rfl: 3  ?  aspirin 81 MG EC tablet, Take 1 tablet (81 mg total) by mouth daily., Disp: 30 tablet, Rfl: 6    Allergies:   Lipitor [atorvastatin]      Objective:      Physical Exam  171 lb (77.6 kg)  5' 4\" (1.626 m)  Body mass index is 29.35 kg/(m^2).  /60 (Patient Site: Right Arm, Patient Position: Sitting, Cuff Size: Adult Regular)  Pulse 84  Resp 16  Ht 5' 4\" (1.626 m)  Wt 171 lb (77.6 kg)  LMP  (LMP Unknown)  BMI 29.35 kg/m2    General Appearance:   Awake, Alert, No acute distress.   HEENT:  Pupil equal and reactive to light. No scleral icterus; the mucous membranes were moist.   Neck: No cervical bruits. No JVD. No thyromegaly.     Chest: The spine was straight. The chest was " symmetric.   Lungs:   Respirations unlabored; Lungs are clear to auscultation. No crackles. No wheezing.   Cardiovascular:   Regular rhythm and rate, normal first and second heart sounds with no murmurs. No rubs or gallops.    Abdomen:  Soft. No tenderness. Non-distended. Bowels sounds are present   Extremities: Equal tibial pulses. No leg edema.   Skin: No rashes or ulcers. Warm, Dry.   Musculoskeletal: No tenderness. No deformity.   Neurologic: Mood and affect are appropriate. No focal deficits.         Cardiac Imaging Studies  Coronary CT angiogram on 5-1-2018:    The total Agatston calcium score is 359. A calcium score in this range places the individual in the 75th percentile when compared to an age and gender matched control group and implies a high risk of cardiac events in the next ten years.    There is mild to moderate multivessel coronary artery disease. No severe stenosis detected in the study.    Nuclear stress test on 4-9-2018:    The exercise nuclear stress test is abnormal.    There is a small area of ischemia in the distal anterior segment(s) of the left ventricle.    Increased stress to rest cavity ratio of 1.43 is consistent with diffuse subendocardial ischemia.    The left ventricular ejection fraction is 70%.    There is no prior study available.    The patient is at an intermediate risk of future cardiac ischemic events.    Lab Review   Lab Results   Component Value Date     03/15/2018    K 4.1 03/15/2018     03/15/2018    CO2 21 (L) 03/15/2018    BUN 25 (H) 03/15/2018    CREATININE 1.20 (H) 03/15/2018    CALCIUM 10.3 03/15/2018     Lab Results   Component Value Date    WBC 6.6 06/05/2017    HGB 14.6 06/05/2017    HCT 42.2 06/05/2017    MCV 95 06/05/2017     06/05/2017     Lab Results   Component Value Date    CHOL 150 06/05/2017     Lab Results   Component Value Date    BNP 15 03/15/2018     Lab Results   Component Value Date    TSH 1.26 03/15/2018

## 2021-06-26 NOTE — PROGRESS NOTES
Progress Notes by Justin Mariano MD at 11/14/2018 11:30 AM     Author: Justin Mariano MD Service: -- Author Type: Physician    Filed: 11/14/2018 11:49 AM Encounter Date: 11/14/2018 Status: Signed    : Justin Mariano MD (Physician)           Click to link to [facility]     [facility] HEART CARE NOTE       Assessment/Plan:   1.  Coronary artery disease, nonobstructive lesions: No chest pain or shortness of breath.  Continue aspirin 81 mg daily, Crestor 20 mg at bedtime.  We discussed lifestyle modification again in clinic, emphasized diet control, regular exercise and weight loss.    2.  Essential hypertension: Her blood pressure has been controlled.  Continue losartan-hydrochlorothiazide.    3.  Dyslipidemia: Continue Crestor 20 mg at bedtime.  Recent LDL was 57.    4.  Type 2 diabetes: No change in her home medications.    Thank you for the opportunity to be involved in the care of Yumiko Peña. If you have any questions, please feel free to contact me.  I will see the patient again in 12 months.    Much or all of the text in this note was generated through the use of Dragon Dictate voice-to-text software. Errors in spelling or words which seem out of context are unintentional.   Sound alike errors, in particular, may have escaped editing.       History of Present Illness:   It is my pleasure to see Yumiko Peña at the [facility] clinic for routine cardiology follow-up. Yumiko Peña is a 68 y.o. female with a medical history of type 2 diabetes for 20 years, essential hypertension, dyslipidemia and coronary artery disease.    The patient states that she has been doing quite well over last 6 months.  She had no chest pain, shortness of breath, dizziness, orthopnea, PND or leg edema.  She has occasional palpitations.  Her blood pressure and heart rate are controlled.  She had no side effects from her current medications.      Past Medical History:     Patient Active Problem List    Diagnosis   ? Diabetes (H)   ? Essential hypertension   ? Metabolic syndrome   ? Dyslipidemia, goal LDL below 100   ? Hemochromatosis   ? Postmenopausal   ? Abnormal nuclear stress test   ? Dyspnea on exertion   ? Coronary artery disease involving native coronary artery of native heart without angina pectoris       Past Surgical History:     Past Surgical History:   Procedure Laterality Date   ? KNEE ARTHROSCOPY  2000   ? TUBAL LIGATION     ? vocal cord polyp         Family History:     Family History   Problem Relation Age of Onset   ? Diabetes Mother    ? Hyperlipidemia Mother    ? Cancer Father         abdominal   ? Hypertension Sister    ? Hyperlipidemia Sister    ? Hemochromatosis Unknown         Social History:    reports that she quit smoking about 22 years ago. she has never used smokeless tobacco. She reports that she does not drink alcohol or use drugs.    Review of Systems:   General: Weight Gain  Eyes: WNL  Ears/Nose/Throat: WNL  Lungs: WNL  Heart: WNL  Stomach: WNL  Bladder: WNL  Muscle/Joints: WNL  Skin: WNL  Nervous System: WNL  Mental Health: WNL     Blood: WNL    Meds:     Current Outpatient Medications:   ?  ascorbic acid (ASCORBIC ACID WITH JACQUES HIPS) 500 MG tablet, Take 500 mg by mouth daily as needed .   , Disp: , Rfl:   ?  BIOTIN ORAL, Take 2,500 mcg by mouth daily .   , Disp: , Rfl:   ?  CALCIUM-MAGNESIUM-ZINC ORAL, Take 1 tablet by mouth daily .   , Disp: , Rfl:   ?  clonazePAM (KLONOPIN) 0.25 MG disintegrating tablet, DISSOLVE 1 TABLET BY MOUTH TWICE DAILY AS NEEDED FOR ANXIETY, Disp: 30 tablet, Rfl: 1  ?  fluticasone (FLONASE) 50 mcg/actuation nasal spray, 1 spray into each nostril 2 (two) times a day as needed., Disp: , Rfl:   ?  GLIMEPIRIDE ORAL, Take 4 mg by mouth daily .   , Disp: , Rfl:   ?  ibuprofen (ADVIL,MOTRIN) 200 MG tablet, Take 200 mg by mouth every 6 (six) hours as needed for pain., Disp: , Rfl:   ?  losartan-hydrochlorothiazide (HYZAAR) 100-25 mg per tablet, TAKE ONE TABLET  "BY MOUTH ONCE DAILY, Disp: 90 tablet, Rfl: 2  ?  METFORMIN HCL (METFORMIN ORAL), Take 2,000 mg by mouth at bedtime .   , Disp: , Rfl:   ?  potassium chloride (KLOR-CON) 10 MEQ CR tablet, TAKE ONE TABLET BY MOUTH ONCE DAILY, Disp: 90 tablet, Rfl: 1  ?  rosuvastatin (CRESTOR) 20 MG tablet, Take 1 tablet (20 mg total) by mouth every evening., Disp: 90 tablet, Rfl: 3  ?  aspirin 81 MG EC tablet, Take 1 tablet (81 mg total) by mouth daily., Disp: 30 tablet, Rfl: 6  ?  clotrimazole (LOTRIMIN) 1 % cream, Apply topically 2 (two) times a day., Disp: 30 g, Rfl: 0    Allergies:   Lipitor [atorvastatin]      Objective:      Physical Exam  172 lb (78 kg)  5' 4\" (1.626 m)  Body mass index is 29.52 kg/m .  /68 (Patient Site: Right Arm, Patient Position: Sitting, Cuff Size: Adult Regular)   Pulse 76   Resp 16   Ht 5' 4\" (1.626 m)   Wt 172 lb (78 kg)   LMP  (LMP Unknown)   BMI 29.52 kg/m      General Appearance:   Awake, Alert, No acute distress.   HEENT:  Pupil equal and reactive to light. No scleral icterus; the mucous membranes were moist.   Neck: No cervical bruits. No JVD. No thyromegaly.     Chest: The spine was straight. The chest was symmetric.   Lungs:   Respirations unlabored; Lungs are clear to auscultation. No crackles. No wheezing.   Cardiovascular:   Regular rhythm and rate, normal first and second heart sounds with no murmurs. No rubs or gallops.    Abdomen:  Soft. No tenderness. Non-distended. Bowels sounds are present   Extremities: Equal tibial pulses. No leg edema.   Skin: No rashes or ulcers. Warm, Dry.   Musculoskeletal: No tenderness. No deformity.   Neurologic: Mood and affect are appropriate. No focal deficits.         Cardiac Imaging Studies  Coronary CT angiogram on 5-1-2018:    The total Agatston calcium score is 359. A calcium score in this range places the individual in the 75th percentile when compared to an age and gender matched control group and implies a high risk of cardiac events in " the next ten years.    There is mild to moderate multivessel coronary artery disease. No severe stenosis detected in the study.    Nuclear stress test on 4-9-2018:    The exercise nuclear stress test is abnormal.    There is a small area of ischemia in the distal anterior segment(s) of the left ventricle.    Increased stress to rest cavity ratio of 1.43 is consistent with diffuse subendocardial ischemia.    The left ventricular ejection fraction is 70%.    There is no prior study available.    The patient is at an intermediate risk of future cardiac ischemic events.    Lab Review   Lab Results   Component Value Date     03/15/2018    K 4.1 03/15/2018     03/15/2018    CO2 21 (L) 03/15/2018    BUN 25 (H) 03/15/2018    CREATININE 1.20 (H) 03/15/2018    CALCIUM 10.3 03/15/2018     Lab Results   Component Value Date    WBC 6.6 06/05/2017    HGB 14.6 06/05/2017    HCT 42.2 06/05/2017    MCV 95 06/05/2017     06/05/2017     Lab Results   Component Value Date    CHOL 152 06/08/2018    CHOL 150 06/05/2017     Lab Results   Component Value Date    BNP 15 03/15/2018     Lab Results   Component Value Date    TSH 1.26 03/15/2018

## 2021-06-28 NOTE — PROGRESS NOTES
Progress Notes by Jamie Adams MD at 10/28/2019  1:00 PM     Author: Jamie Adams MD Service: -- Author Type: Physician    Filed: 10/28/2019  5:40 PM Encounter Date: 10/28/2019 Status: Signed    : Jamie Adams MD (Physician)       Preoperative Exam    Scheduled Procedure: Cataract   Surgery Date:  11/6/2019 & 11/20/2019  Surgery Location: [facility], fax [phone]    Surgeon:  Dr. Farrell    Assessment/Plan:     1. Back pain  Likely musculoskeletal in nature, such as a strain.  May be related to when she was helping her  move their boat up in the cabin around the same time.  Differential also includes mass lesion, pyelonephritis, pneumonia, hematoma.  Nonradicular neighbor.  Infection is less likely due to lack of constitutional symptoms. She will be visiting her daughter in [place] and return in a week.  Will reach out to us if symptoms do not improve, at which time might need more dedicated imaging CT abdomen.    2. CKD (chronic kidney disease) stage 3, GFR 30-59 ml/min (H)  On losartan-HCTZ.  SBP well-controlled today.  We will recheck to monitor electrolytes and renal function. Counseled patient to continue to abstain from NSAIDs and nephrotoxic medications  - Basic Metabolic Panel; Future  - Basic Metabolic Panel    3. Preoperative examination  Counseled patient to not take glipizide or metformin the day of surgery.  Can continue to take losartan-HCTZ prior to surgery.  Counseled to abstain from NSAIDs as much as possible.  We will continue to take aspirin 81 mg.  Awaiting BMP to evaluate electrolytes and state of creatinine/renal function    Surgical Procedure Risk: Labs pending at this time.  Results will be reviewed when available.  Have you had prior anesthesia?: Yes  Have you or any family members had a previous anesthesia reaction:  No  Do you or any family members have a history of a clotting or bleeding disorder?:  No  Cardiac Risk Assessment: no increased risk for major cardiac complications    APPROVAL GIVEN to proceed with proposed procedure, without further diagnostic evaluation    Please Note:  Counseled patient to not take metformin and glipizide the day of her surgery.  She will take her losartan-HCTZ the morning of surgery    Functional Status: Independent  Patient plans to recover at home with family.     Subjective:      Yumiko Peña is a 69 y.o. female who presents for a preoperative consultation.  She will be having bilateral cataract surgery on 11/6/2019 and 11/20/2019 respectively.  Only on 81 mg aspirin.  Denies prior reaction to anesthesia or bleeding diathesis.  Denies difficulty with instrumental and basic ADLs.  No recent f/s/c or chest pain.  Also complains of low back pain, most prominent on the right side.  Symptoms been present for the last month.  Denies any fall, trauma.  Pain is dull, 2/10 in severity.  Comes and goes, but unable to stay what triggers it.  Did have one episode 3 weeks ago where pain radiated around to the front of her abdomen, 4/10 in severity.  This is not happened again.  Wonders if stress is at play; more specifically with her family, and grandson.  Endorses drinking lots of water.  Denies dysuria, foul-smelling urine, hematuria or suprapubic discomfort.  Has increased urinary frequency at baseline but no worse than normal.  Denies diagnosis of scoliosis.  Endorses roughly 1 month ago she was doing a lot of hard labor with her , including moving a boat.  Up-to-date on her colon and breast screening.  Sister had colon cancer at age 65.  Due for colonoscopy in 2020 due to history of polyps.  Denies unintentional weight loss.  Stopped smoking 30 years ago.  History of osteoporosis/osteopenia.  Denies any shooting pain down the legs, loss of bowel or bladder function or any leg weakness.  She wonders if it is related to her kidneys and would like to have her BMP  checked.    All other systems reviewed and are negative, other than those listed in the HPI.    Pertinent History  Do you have difficulty breathing or chest pain after walking up a flight of stairs: No  History of obstructive sleep apnea: No  Steroid use in the last 6 months: Yes: Cortisone in the knee in July  Frequent Aspirin/NSAID use: No  Prior Blood Transfusion: No  Prior Blood Transfusion Reaction: No  If for some reason prior to, during or after the procedure, if it is medically indicated, would you be willing to have a blood transfusion?:  There is no transfusion refusal.    Current Outpatient Medications   Medication Sig Dispense Refill   ? ascorbic acid (ASCORBIC ACID WITH JACQUES HIPS) 500 MG tablet Take 500 mg by mouth daily as needed .           ? aspirin 81 MG EC tablet Take 1 tablet (81 mg total) by mouth daily. 30 tablet 6   ? BIOTIN ORAL Take 2,500 mcg by mouth daily .           ? CALCIUM-MAGNESIUM-ZINC ORAL Take 1 tablet by mouth daily .           ? clonazePAM (KLONOPIN) 0.25 MG disintegrating tablet DISSOLVE 1 TABLET IN MOUTH TWICE DAILY AS NEEDED 60 tablet 5   ? fluticasone (FLONASE) 50 mcg/actuation nasal spray 1 spray into each nostril 2 (two) times a day as needed.     ? GLIMEPIRIDE ORAL Take 4 mg by mouth daily .           ? ibuprofen (ADVIL,MOTRIN) 200 MG tablet Take 200 mg by mouth every 6 (six) hours as needed for pain.     ? losartan-hydrochlorothiazide (HYZAAR) 100-25 mg per tablet Take 1 tablet by mouth daily. 90 tablet 3   ? METFORMIN HCL (METFORMIN ORAL) Take 2,000 mg by mouth at bedtime .           ? potassium chloride (KLOR-CON) 10 MEQ CR tablet TAKE 1 TABLET BY MOUTH ONCE DAILY 90 tablet 3   ? rosuvastatin (CRESTOR) 20 MG tablet TAKE 1 TABLET BY MOUTH ONCE DAILY IN THE EVENING 90 tablet 3     No current facility-administered medications for this visit.         Allergies   Allergen Reactions   ? Lipitor [Atorvastatin] Myalgia       Patient Active Problem List   Diagnosis   ? Diabetes  (H)   ? Essential hypertension   ? Metabolic syndrome   ? Dyslipidemia, goal LDL below 100   ? Hemochromatosis   ? Postmenopausal   ? Abnormal nuclear stress test   ? Dyspnea on exertion   ? Coronary artery disease involving native coronary artery of native heart without angina pectoris     Past Medical History:   Diagnosis Date   ? DM type 2 (diabetes mellitus, type 2) (H)    ? Hemochromatosis    ? Hyperlipidemia    ? Hypertension      Past Surgical History:   Procedure Laterality Date   ? KNEE ARTHROSCOPY     ? TUBAL LIGATION     ? vocal cord polyp        Socioeconomic History   ? Marital status:    Tobacco Use   ? Smoking status: Former Smoker     Last attempt to quit: 1995     Years since quittin.8   ? 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 Hearing was normal to voice   RESPIRATORY: Bilaterally with no crackles, wheezing or rhonchi  CARDIOVASCULAR: Regular S1 and S2.  Radial pulses intact.  No edema.  GASTROINTESTINAL: NABS. Soft. No organ enlargement or tenderness.  No tenderness, mass, or enlarged organs.   MUSCULOSKELETAL: Nontender with deep palpation along the lower lateral rib borders.  No CVA tenderness or warmth.  No hepatomegaly or splenomegaly.  No spinous process/paraspinal muscle discomfort with deep palpation.    LYMPHATIC: There were no enlarged nodes.  SKIN/HAIR/NAILS: No rash, vesicles or erythema along right flank  NEUROLOGIC: Alert and oriented x4. Speech was normal. No focal  deficits  PSYCHIATRIC:  Mood and affect were normal and the patient had normal recent and remote memory. The patient's judgment and insight were normal.    Patient Instructions   Patient Education     Chronic Kidney Disease (CKD)     The role of the kidneys is to remove waste products and extra water from the blood.  When the kidneys do not work as they should, waste products begin to build up in the blood. This is called chronic kidney disease (CKD). CKD means that you have kidney damage or a decrease in kidney function lasting at least 3 months. CKD allows extra water, waste, and toxins to build up in the body. This can eventually become life-threatening. You might need dialysis or a kidney transplant to stay alive. This most severe form is called end stage renal disease.  Diabetes is the leading causes of chronic renal disease. Other causes include high blood pressure, hardening of the arteries (atherosclerosis), lupus, inflammation of the blood vessels (vasculitis), and past viral or bacterial infections. Certain over-the-counter pain medicines can cause renal failure when taken often over a long period of time. These include aspirin, ibuprofen, and related anti-inflammatory medicines called NSAIDs (nonsteroidal anti-inflammatory drugs).  Home care  The following guidelines will help you care for yourself at home:    If you have diabetes, talk with your healthcare provider about keeping your blood sugar under control. Ask if you need to make and changes to your diet, lifestyle, or medicines.    If you have high blood pressure:  ? Take prescribed medicine to lower your blood pressure to the recommended goal of less than 130/80.  ? Start a regular exercise program that you enjoy. Check with your healthcare provider to be sure your planned exercise program is right for you.  ? Eat less salt (sodium). Your healthcare provider can tell you how much salt per day is safe for you.    If you are overweight, talk with  your healthcare provider about a weight loss plan.    If you smoke, you must quit. Smoking makes kidney disease worse. Talk with your healthcare provider about ways to help you quit.  For more information, visit the following links:  ? www.smokefree.gov/sites/default/files/pdf/clearing-the-air-accessible.pdf  ? www.smokefree.gov  ? www.cancer.org/healthy/stayawayfromtobacco/guidetoquittingsmoking/    Most people with CKD need to follow a special diet.  Be sure you understand yours. In general, you will need to limit protein, salt, potassium, and phosphorus. You also need to limit how much fluid you drink.     CKD is a risk factor for heart disease. Talk with your healthcare provider about any other risk factors you might have and what you can do to lessen them.    Talk with your healthcare provider about any medicines you are taking to find out if they need to be reduced or stopped.    Don't use the following over-the-counter medicines, or consult your healthcare provider before using:  ? Aspirin and NSAIDs such as ibuprofen or naproxen. Using acetaminophen for fever or pain is OK.  ? Laxatives and antacids containing magnesium or aluminum  ? Fleet or phospho soda enemas containing phosphorus  ? Certain stomach acid-blocking medicine such as cimetidine or ranitidine   ? Decongestants containing pseudoephedrine   ? Herbal supplements  Follow-up care  Follow up with your healthcare provider, or as advised. Contact one of the following for more information:    American Association of Kidney Patients 229-194-7529 www.aakp.org    National Kidney Foundation 884-491-8268 www.kidney.org    American Kidney Fund 715-702-4388 www.kidneyfund.org    National Kidney Disease Education Program 866-4KIDNEY www.nkdep.nih.gov  If an X-ray, ECG (cardiogram), or other diagnostic test was taken, you will be told of any new findings that may affect your care.  Call 911  Call 911 if you have any of the following:    Severe weakness,  dizziness, fainting, drowsiness, or confusion    Chest pain or shortness of breath    Heart beating fast, slow, or irregularly  When to seek medical advice  Call your healthcare provider right away if any of these occur:    Nausea or vomiting    Fever of 100.4 F (38 C) or higher, or as directed by your healthcare provider    Unexpected weight gain or swelling in the legs, ankles, or around the eyes    Decrease or absent urine output  Date Last Reviewed: 9/1/2016 2000-2017 [employer]. [address] [place] [address]. All rights reserved. This information is not intended as a substitute for professional medical care. Always follow your healthcare professional's instructions.         Patient Education     Flank Pain, Uncertain Cause  The flank is the area between your upper abdomen and your back. Pain there is often caused by a problem with your kidneys. It might be a kidney infection or a kidney stone. Other causes of flank pain include spinal arthritis, a pinched nerve from a back injury, or a back muscle strain or spasm.  The cause of your flank pain is not certain. You may need other tests.  Home care  Follow these tips when caring for yourself at home:    You may use acetaminophen or ibuprofen to control pain, unless your health care provider prescribed another medicine. If you have chronic liver or kidney disease, talk with your provider before taking these medicines. Also talk with your provider first if youve ever had a stomach ulcer or GI bleeding.    If the pain is coming from your muscles, you may get relief with ice or heat. During the first 2 days after the injury, put an ice pack on the painful area for 20 minutes every 2 to 4 hours. This will reduce swelling and pain. A hot shower, hot bath, or heating pad works well for a muscle spasm. You can start with ice, then switch to heat after 2 days. You might find that alternating ice and heat works well. Use the method that feels  the best to you.  Follow-up care  Follow up with your healthcare provider if your symptoms dont get better over the next few days.  When to seek medical advice  Call your healthcare provider right away if any of these happen:    Repeated vomiting    Fever of 100.4 F (38 C) or higher, or as directed by your health care provider    Flank pain that gets worse    Pain that spreads to the front of your belly (abdomen)    Dizziness, weakness, or fainting    Blood in your urine    Burning feeling when you urinate or the need to urinate often    Pain in one of your legs that gets worse    Numbness or weakness in a leg  Date Last Reviewed: 10/1/2016    0625-2824 [employer]. [address] [place] [address]. All rights reserved. This information is not intended as a substitute for professional medical care. Always follow your healthcare professional's instructions.               EKG: Not necessary    Labs:  Labs pending at this time.  Results will be reviewed when available.    Immunization History   Administered Date(s) Administered   ? Influenza, inj, historic,unspecified 11/12/2016, 01/09/2018   ? Pneumo Polysac 23-V 01/19/2007   ? Td,adult,historic,unspecified 01/01/2008   ? ZOSTER, LIVE 09/19/2012           Electronically signed by Jamie Adams MD 10/28/19 1:04 PM

## 2021-06-29 NOTE — PROGRESS NOTES
Progress Notes by Jose Piña MD at 7/14/2020 11:20 AM     Author: Jose Piña MD Service: -- Author Type: Physician    Filed: 7/14/2020  1:12 PM Encounter Date: 7/14/2020 Status: Signed    : Jose Piña MD (Physician)         Assessment and Plan:     Annual wellness visit for this 70-year-old lady, generally doing well, I saw her February 11, 2020 for preop exam before her left cataract surgery that went well.  Issues are bilateral knee osteoarthritis, right worse than left, told bone-on-bone, may be headed to a knee replacement.  Right cervical radiculopathy sensory symptoms, with right trapezius ridge pain, suggest she asked [facility] about doing physical therapy for that.  Type 2 diabetes, on 3 oral medications of metformin, glimepiride, and pioglitazone, managed by Dr. Lowery at [facility] endocrinology.  Essential hypertension, blood pressure well controlled on combination of losartan plus hydrochlorothiazide.  Muscle cramps, she has been off her potassium supplement, will check potassium and magnesium levels today.  Mild renal insufficiency with GFR 49.  Hyperlipidemia in the context of diabetes and hypertension and coronary calcifications, with elevated triglycerides related to diabetes; Dr. Lowery will be checking her lipids.  Asymptomatic coronary disease.  Overweight with body mass index of 29.6.  Restless leg syndrome, takes clonazepam for that.  History of treatment for hemochromatosis, but had satisfactory ferritin levels and normal liver chemistry test done in February 2020.  Post menopause, satisfactory DEXA bone density scan done January 2020.  Had screening colonoscopy April 2020, good for 5 years, recheck 2025.  Due for screening mammography.  Up-to-date on vaccines except tetanus booster, which I told her she can get at a community pharmacy under her Medicare prescription drug benefit.    Bilateral knee osteoarthritis, right worse than left, told bone-on-bone, may  be headed to a knee replacement.    Right cervical radiculopathy sensory symptoms, with right trapezius ridge pain, suggest she asked [facility] about doing physical therapy for that.     Type 2 diabetes, currently on 3 oral medications of metformin, glimepiride, and pioglitazone, with suboptimally controlled A1c and elevated morning blood sugars.  She is currently working with Dr. Lowery, endocrinology at [facility].  She told me that Dr. Lowery is considering having her take an evening dose of insulin.  She also needs to reduce the size of her supper.  She does not have any history of neuropathy, retinopathy, or nephropathy.  No history of heart attack or stroke either.  Lab Results   Component Value Date    HGBA1C 9.7 (H) 03/15/2018      Essential hypertension, in the context of diabetes, with good blood pressure control on losartan plus hydrochlorothiazide with potassium supplementation.   BP Readings from Last 3 Encounters:   07/14/20 112/70   06/30/20 104/65   02/11/20 107/63     Mild renal insufficiency with GFR 49.      Hyperlipidemia, in the context of diabetes and hypertension, with no history of cardiovascular events, on moderate intensity rosuvastatin.  Lab Results   Component Value Date    CHOL 152 06/08/2018    CHOL 150 06/05/2017     Lab Results   Component Value Date    HDL 46 (L) 06/08/2018     Lab Results   Component Value Date    LDLCALC 57 06/08/2018     Lab Results   Component Value Date    TRIG 246 (H) 06/08/2018     Asymptomatic coronary artery disease, with coronary calcification study performed May 1, 2018 showing mild to moderate multivessel coronary artery disease, but no severe stenoses.  She takes a baby aspirin per day, and should continue on that.     Overweight with body mass index of 29.6, needs to lose about 30 pounds.  Wt Readings from Last 3 Encounters:   07/14/20 170 lb (77.1 kg)   02/11/20 172 lb 6.4 oz (78.2 kg)   01/06/20 172 lb 1.3 oz (78.1 kg)     Restless leg syndrome,  takes clonazepam for that, which controls symptoms well.    History of treatment for hemochromatosis, but had satisfactory ferritin levels and normal liver chemistry test done in February 2020.   Lab Results   Component Value Date    FERRITIN 47 02/11/2020     Lab Results   Component Value Date    ALT 20 02/11/2020    AST 13 02/11/2020    ALKPHOS 46 02/11/2020    BILITOT 0.8 02/11/2020     Low back pain, was probably a muscle strain presenting in October 2019, has resolved.    Post menopause, satisfactory DEXA bone density scan, with actually positive T-scores performed January 27, 2020. Continue with calcium supplement and weightbearing exercise    Rhinitis, for which he uses nasal steroid fluticasone    Screening colonoscopy done April 2020, she told good for 5 years    Screening mammogram is due about now, and she is going to schedule that herself.    Up-to-date on vaccines except tetanus booster, which I told her she can get at a community pharmacy under her Medicare prescription drug benefit.      The patient's current medical problems were reviewed.    I have had an Advance Directives discussion with the patient.  The following health maintenance schedule was reviewed with the patient and provided in printed form in the after visit summary:   Health Maintenance   Topic Date Due   ? HEPATITIS C SCREENING  1950   ? DIABETIC FOOT EXAM  1950   ? COLORECTAL CANCER SCREENING  03/18/1966   ? ZOSTER VACCINES (2 of 3) 11/14/2012   ? MEDICARE ANNUAL WELLNESS VISIT  03/18/2013   ? TD 18+ HE  01/01/2018   ? MAMMOGRAM  01/24/2018   ? A1C  09/15/2018   ? PNEUMOCOCCAL IMMUNIZATION 65+ HIGH/HIGHEST RISK (2 of 2 - PPSV23) 01/02/2019   ? LIPID  06/08/2019   ? MICROALBUMIN  03/28/2020   ? DIABETIC EYE EXAM  06/14/2020   ? INFLUENZA VACCINE RULE BASED (1) 08/01/2020   ? BMP  02/11/2021   ? FALL RISK ASSESSMENT  02/11/2021   ? DXA SCAN  01/27/2022   ? ADVANCE CARE PLANNING  06/05/2022        Subjective:   Chief  Complaint: Yumiko Peña is an 70 y.o. female here for an Annual Wellness visit.   HPI:      Bilateral knee OA  Right is worse  Dr Sparks at [place]  Had cortisone shot May 2020    Right trepazius ridge pain  Several weeks  Hurts with extension and lateral flexaion    I saw her 2-11-20 for a preoperative medical exam for left cataract surgery performed February 19, 2019 at [facility] by Dr. Elliott.     Metabolic panel had mildly reduced GFR (estimated 49) and glucose of 165, otherwise satisfactory. CBC normal.  Ferritin within normal limits.    Type 2 diabetes, currently on 3 oral medications of metformin, glimepiride, and pioglitazone, with suboptimally controlled A1c and elevated morning blood sugars.  She is currently working with Dr. Lowery, endocrinology at [facility].  She told me that Dr. Lowery is considering having her take an evening dose of insulin.  She also needs to reduce the size of her supper.  She does not have any history of neuropathy, retinopathy, or nephropathy.  No history of heart attack or stroke either.  Lab Results   Component Value Date    HGBA1C 9.7 (H) 03/15/2018      Essential hypertension, in the context of diabetes, with good blood pressure control on losartan plus hydrochlorothiazide with potassium supplementation.   BP Readings from Last 3 Encounters:   07/14/20 112/70   06/30/20 104/65   02/11/20 107/63     History of what was labeled as stage III chronic kidney disease   Ref Range & Units  2/11/20 1117 10/28/19 1404 3/28/19 1012 3/28/19 1012       Creatinine  0.60 - 1.10 mg/dL  1.09   1.03    1.11High         GFR MDRD Af Amer  >60 mL/min/1.73m2  60Low     >60    59Low         GFR MDRD Non Af Amer  >60 mL/min/1.73m2  49Low            Hyperlipidemia, in the context of diabetes and hypertension, with no history of cardiovascular events, on moderate intensity rosuvastatin.  Lab Results   Component Value Date    CHOL 152 06/08/2018    CHOL 150 06/05/2017     Lab Results    Component Value Date    HDL 46 (L) 06/08/2018     Lab Results   Component Value Date    LDLCALC 57 06/08/2018     Lab Results   Component Value Date    TRIG 246 (H) 06/08/2018     No components found for: CHOLHDL    Asymptomatic coronary artery disease, with coronary calcification study performed May 1, 2018 showing mild to moderate multivessel coronary artery disease, but no severe stenoses.  She takes a baby aspirin per day, and should continue on that.     Overweight with body mass index of 29.6, needs to lose about 30 pounds.  Wt Readings from Last 3 Encounters:   07/14/20 170 lb (77.1 kg)   02/11/20 172 lb 6.4 oz (78.2 kg)   01/06/20 172 lb 1.3 oz (78.1 kg)     Restless leg syndrome, takes clonazepam for that, which controls symptoms well.     Hemochromatosis, used to do phlebotomy, but has not needed to do that in about 2 years.  I told her to eliminate the vitamin C supplement that she had been taking, because vitamin C can potentiate the absorption of iron from the gastrointestinal tract.  Lab Results   Component Value Date    FERRITIN 47 02/11/2020     Lab Results   Component Value Date    ALT 20 02/11/2020    AST 13 02/11/2020    ALKPHOS 46 02/11/2020    BILITOT 0.8 02/11/2020     Low back pain, was probably a muscle strain presenting in October 2019, has resolved.    Post menopause, satisfactory DEXA bone density scan, with actually positive T-scores performed January 27, 2020.  Probably does not need to be tested again.  Continue with calcium supplement and weightbearing exercise    Rhinitis, for which he uses nasal steroid fluticasone    Screening colonoscopy done April 2020, she told good for 5 years    Screening mammogram is due about now, and she is going to schedule that herself.    Immunization History   Administered Date(s) Administered   ? Influenza high dose,seasonal,PF, 65+ yrs 12/20/2019   ? Influenza, inj, historic,unspecified 11/12/2016, 01/09/2018, 12/20/2019   ? Pneumo Conj 13-V  (2010&after) 2018   ? Pneumo Polysac 23-V 2007   ? Td,adult,historic,unspecified 2008   ? ZOSTER, LIVE 2012       Review of Systems:   Please see above.  The rest of the review of systems are negative for all systems.    Patient Care Team:  Jose Piña MD as PCP - General (Internal Medicine)  Jamie Adams MD as Assigned PCP     Patient Active Problem List   Diagnosis   ? Diabetes (H)   ? Essential hypertension   ? Metabolic syndrome   ? Dyslipidemia, goal LDL below 100   ? Hemochromatosis   ? Postmenopausal   ? Abnormal nuclear stress test   ? Dyspnea on exertion   ? Coronary artery disease involving native coronary artery of native heart without angina pectoris     Past Medical History:   Diagnosis Date   ? DM type 2 (diabetes mellitus, type 2) (H)    ? Hemochromatosis    ? Hyperlipidemia    ? Hypertension       Past Surgical History:   Procedure Laterality Date   ? CATARACT EXTRACTION Right 2019   ? KNEE ARTHROSCOPY     ? TUBAL LIGATION     ? vocal cord polyp        Family History   Problem Relation Age of Onset   ? Diabetes Mother    ? Hyperlipidemia Mother    ? Cancer Father         abdominal   ? Hypertension Sister    ? Hyperlipidemia Sister    ? Hemochromatosis Unknown       Social History     Socioeconomic History   ? Marital status:      Spouse name: Not on file   ? Number of children: Not on file   ? Years of education: Not on file   ? Highest education level: Not on file   Occupational History   ? Not on file   Social Needs   ? Financial resource strain: Not on file   ? Food insecurity     Worry: Not on file     Inability: Not on file   ? Transportation needs     Medical: Not on file     Non-medical: Not on file   Tobacco Use   ? Smoking status: Former Smoker     Last attempt to quit: 1995     Years since quittin.5   ? Smokeless tobacco: Never Used   Substance and Sexual Activity   ? Alcohol use: No   ? Drug use: No   ? Sexual activity:  Not on file   Lifestyle   ? Physical activity     Days per week: Not on file     Minutes per session: Not on file   ? Stress: Not on file   Relationships   ? Social connections     Talks on phone: Not on file     Gets together: Not on file     Attends Restorationist service: Not on file     Active member of club or organization: Not on file     Attends meetings of clubs or organizations: Not on file     Relationship status: Not on file   ? Intimate partner violence     Fear of current or ex partner: Not on file     Emotionally abused: Not on file     Physically abused: Not on file     Forced sexual activity: Not on file   Other Topics Concern   ? 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 aspirin 81 MG EC tablet Take 1 tablet (81 mg total) by mouth daily. 30 tablet 6   ? BIOTIN ORAL Take 2,500 mcg by mouth daily .           ? CALCIUM-MAGNESIUM-ZINC ORAL Take 1 tablet by mouth daily .           ? clonazePAM (KLONOPIN) 0.25 MG disintegrating tablet DISSOLVE 1 TABLET IN MOUTH TWICE DAILY AS NEEDED 60 tablet 0   ? fluticasone (FLONASE) 50 mcg/actuation nasal spray 1 spray into each nostril 2 (two) times a day as needed.     ? GLIMEPIRIDE ORAL Take 4 mg by mouth daily .           ? hydroCHLOROthiazide (HYDRODIURIL) 25 MG tablet Take 1 tablet (25 mg total) by mouth daily. 90 tablet 3   ? ibuprofen (ADVIL,MOTRIN) 200 MG tablet Take 200 mg by mouth every 6 (six) hours as needed for pain.     ? losartan (COZAAR) 100 MG tablet Take 1 tablet (100 mg total) by mouth daily. 90 tablet 3   ? METFORMIN HCL (METFORMIN ORAL) Take 2,000 mg by mouth at bedtime .           ? pioglitazone (ACTOS) 15 MG tablet Take 15 mg by mouth daily.     ? potassium chloride (KLOR-CON) 10 MEQ CR tablet Take 1 tablet by mouth once daily 90 tablet 2   ? rosuvastatin (CRESTOR) 20 MG tablet  "TAKE 1 TABLET BY MOUTH ONCE DAILY IN THE EVENING 90 tablet 2   ? VITAMIN A ORAL Take 2,400 mcg by mouth daily.       No current facility-administered medications for this visit.       Objective:   Vital Signs:   Visit Vitals  /70 (Patient Site: Right Arm, Patient Position: Sitting, Cuff Size: Adult Regular)   Pulse 68   Temp 98.7  F (37.1  C) (Oral)   Ht 5' 4.25\" (1.632 m)   Wt 170 lb (77.1 kg)   LMP  (LMP Unknown)   SpO2 96%   BMI 28.95 kg/m           VisionScreening:  No exam data present     PHYSICAL EXAM  General: Alert, in no distress  Skin: No significant lesion seen.  Eyes/nose/throat: Eyes without scleral icterus, eye movements normal, pupils equal and reactive, oropharynx clear,  MSK: + Neck with lack of cervical lordosis.  I was able to reproduce a bit of her symptoms by having her extend her neck with lateral flexion to the right, this suggests cervical radicular irritation.  Lymphatic: Neck without adenopathy or masses  Endocrine: Thyroid with no nodules to palpation  Pulm: Lungs clear to auscultation bilaterally  Cardiac: Heart with regular rate and rhythm, no murmur or gallop  GI: Abdomen obese soft, nontender. No palpable enlargement of liver or spleen  MSK: Extremities no tenderness or edema  + Bony hypertrophy both knees, right greater than left, no significant effusion  Neuro: Moves all extremities, without focal weakness  Psych: Alert, normal mental status. Normal affect and speech      Assessment Results 7/14/2020   Activities of Daily Living No help needed   Instrumental Activities of Daily Living No help needed   Mini Cog Total Score 5   Some recent data might be hidden     A Mini-Cog score of 0-2 suggests the possibility of dementia, score of 3-5 suggests no dementia    Identified Health Risks:     She is at risk for lack of exercise and has been provided with information to increase physical activity for the benefit of her well-being.  Patient's advanced directive was discussed and I am " comfortable with the patient's wishes.

## 2021-07-03 NOTE — ADDENDUM NOTE
Addendum Note by Jamie Puala MD at 10/28/2019  1:00 PM     Author: Jamie Paula MD Service: -- Author Type: Physician    Filed: 10/31/2019  5:38 PM Encounter Date: 10/28/2019 Status: Signed    : Jamie Paula MD (Physician)    Addended by: JAMIE PAULA on: 10/31/2019 05:38 PM        Modules accepted: Level of Service

## 2021-07-20 DIAGNOSIS — I10 ESSENTIAL HYPERTENSION: ICD-10-CM

## 2021-07-23 RX ORDER — LOSARTAN POTASSIUM 100 MG/1
TABLET ORAL
Qty: 90 TABLET | Refills: 0 | Status: SHIPPED | OUTPATIENT
Start: 2021-07-23 | End: 2021-07-26

## 2021-08-21 ENCOUNTER — HEALTH MAINTENANCE LETTER (OUTPATIENT)
Age: 71
End: 2021-08-21

## 2021-08-30 ENCOUNTER — TRANSFERRED RECORDS (OUTPATIENT)
Dept: HEALTH INFORMATION MANAGEMENT | Facility: CLINIC | Age: 71
End: 2021-08-30

## 2021-09-07 DIAGNOSIS — R25.2 MUSCLE CRAMPS: ICD-10-CM

## 2021-09-07 DIAGNOSIS — I10 HYPERTENSION: ICD-10-CM

## 2021-09-08 RX ORDER — POTASSIUM CHLORIDE 750 MG/1
10 TABLET, EXTENDED RELEASE ORAL DAILY
Qty: 90 TABLET | Refills: 2 | Status: SHIPPED | OUTPATIENT
Start: 2021-09-08

## 2021-09-08 NOTE — TELEPHONE ENCOUNTER
"Last Written Prescription Date:  7/14/20  Last Fill Quantity: 90,  # refills: 2   Last office visit provider:  3/9/21     Requested Prescriptions   Pending Prescriptions Disp Refills     potassium chloride ER (K-TAB/KLOR-CON) 10 MEQ CR tablet [Pharmacy Med Name: Potassium Chloride ER 10 MEQ Oral Tablet Extended Release] 90 tablet 0     Sig: Take 1 tablet by mouth once daily       Potassium Supplements Protocol Passed - 9/7/2021  6:26 PM        Passed - Recent (12 mo) or future (30 days) visit within the authorizing provider's department     Patient has had an office visit with the authorizing provider or a provider within the authorizing providers department within the previous 12 mos or has a future within next 30 days. See \"Patient Info\" tab in inbasket, or \"Choose Columns\" in Meds & Orders section of the refill encounter.              Passed - Medication is active on med list        Passed - Patient is age 18 or older        Passed - Normal serum potassium in past 12 months     Recent Labs   Lab Test 03/09/21  1620   POTASSIUM 4.5                         Neville Alejandro RN 09/08/21 12:01 PM  "

## 2021-10-04 DIAGNOSIS — G25.81 RESTLESS LEGS SYNDROME (RLS): ICD-10-CM

## 2021-10-04 RX ORDER — CLONAZEPAM 0.25 MG/1
TABLET, ORALLY DISINTEGRATING ORAL
Qty: 60 TABLET | Refills: 0 | Status: SHIPPED | OUTPATIENT
Start: 2021-10-04 | End: 2021-12-12

## 2021-10-04 NOTE — TELEPHONE ENCOUNTER
Routing refill request to provider for review/approval because:  Drug not on the [facility] refill protocol     Last Written Prescription Date:  5/24/21  Last Fill Quantity: 60,  # refills: 0   Last office visit provider:  3/9/21     Requested Prescriptions   Pending Prescriptions Disp Refills     clonazePAM (KLONOPIN) 0.25 MG TBDP ODT tab [Pharmacy Med Name: clonazePAM 0.25 MG Oral Tablet Disintegrating] 60 tablet 0     Sig: DISSOLVE 1 TABLET IN MOUTH TWICE DAILY AS NEEDED       There is no refill protocol information for this order          Brock Wagner RN 10/04/21 11:33 AM

## 2021-10-16 ENCOUNTER — HEALTH MAINTENANCE LETTER (OUTPATIENT)
Age: 71
End: 2021-10-16

## 2021-10-22 DIAGNOSIS — I10 ESSENTIAL HYPERTENSION: ICD-10-CM

## 2021-10-24 RX ORDER — LOSARTAN POTASSIUM 100 MG/1
TABLET ORAL
Qty: 90 TABLET | Refills: 0 | OUTPATIENT
Start: 2021-10-24

## 2021-11-10 ENCOUNTER — HOSPITAL ENCOUNTER (OUTPATIENT)
Dept: MAMMOGRAPHY | Facility: CLINIC | Age: 71
Discharge: HOME | End: 2021-11-10
Attending: INTERNAL MEDICINE | Admitting: INTERNAL MEDICINE
Payer: COMMERCIAL

## 2021-11-10 DIAGNOSIS — Z12.31 VISIT FOR SCREENING MAMMOGRAM: ICD-10-CM

## 2021-11-10 PROCEDURE — 77067 SCR MAMMO BI INCL CAD: CPT

## 2021-11-19 ENCOUNTER — TELEPHONE (OUTPATIENT)
Dept: INTERNAL MEDICINE | Facility: CLINIC | Age: 71
End: 2021-11-19
Payer: COMMERCIAL

## 2021-11-19 DIAGNOSIS — F32.9 REACTIVE DEPRESSION: Primary | ICD-10-CM

## 2021-11-19 RX ORDER — CITALOPRAM HYDROBROMIDE 10 MG/1
10 TABLET ORAL DAILY
Qty: 30 TABLET | Refills: 4 | Status: SHIPPED | OUTPATIENT
Start: 2021-11-19 | End: 2022-04-23

## 2021-11-19 NOTE — TELEPHONE ENCOUNTER
Patient notified of clinician's message and verbalized understanding. No further questions at this time.   Janine Carver CMA ............... 4:09 PM, 11/19/21

## 2021-11-19 NOTE — TELEPHONE ENCOUNTER
Patient is calling, and her  was diagnosed with cancer. She is having trouble with depression, and wants to ask Dr. Piña if he could prescribe and anti depressant for her since the holidays are going to be really difficult. I tried to make any type of appt with Dr. Piña and there are no appts with him until 12/23 and patient of course, wants to try this sooner.  Can you send this to Dr. Piña?  Or can someone fit her in with him with either a telephone visit or an in office visit asap?  Thank you.

## 2021-12-06 ENCOUNTER — TRANSFERRED RECORDS (OUTPATIENT)
Dept: HEALTH INFORMATION MANAGEMENT | Facility: CLINIC | Age: 71
End: 2021-12-06
Payer: COMMERCIAL

## 2021-12-08 DIAGNOSIS — G25.81 RESTLESS LEGS SYNDROME (RLS): ICD-10-CM

## 2021-12-10 NOTE — TELEPHONE ENCOUNTER
Routing refill request to provider for review/approval because:  Controlled medication refill request.  Routing to provider's care team.    Last Written Prescription Date:  10/4/21  Last Fill Quantity: 60,  # refills: 0   Last office visit provider:  3/9/21      Requested Prescriptions   Pending Prescriptions Disp Refills     clonazePAM (KLONOPIN) 0.25 MG TBDP ODT tab [Pharmacy Med Name: CLONAZEPAM 0.25MG   ODT] 60 tablet 0     Sig: DISSOLVE 1 TABLET IN MOUTH TWICE DAILY AS NEEDED       There is no refill protocol information for this order            Christina Vuong RN 12/09/21 6:27 PM

## 2021-12-12 RX ORDER — CLONAZEPAM 0.25 MG/1
TABLET, ORALLY DISINTEGRATING ORAL
Qty: 60 TABLET | Refills: 0 | Status: SHIPPED | OUTPATIENT
Start: 2021-12-12

## 2022-01-05 ENCOUNTER — TRANSFERRED RECORDS (OUTPATIENT)
Dept: HEALTH INFORMATION MANAGEMENT | Facility: CLINIC | Age: 72
End: 2022-01-05
Payer: COMMERCIAL

## 2022-01-17 ENCOUNTER — TRANSFERRED RECORDS (OUTPATIENT)
Dept: HEALTH INFORMATION MANAGEMENT | Facility: CLINIC | Age: 72
End: 2022-01-17
Payer: COMMERCIAL

## 2022-03-16 ENCOUNTER — TRANSFERRED RECORDS (OUTPATIENT)
Dept: HEALTH INFORMATION MANAGEMENT | Facility: CLINIC | Age: 72
End: 2022-03-16
Payer: COMMERCIAL

## 2022-04-07 ENCOUNTER — TRANSFERRED RECORDS (OUTPATIENT)
Dept: HEALTH INFORMATION MANAGEMENT | Facility: CLINIC | Age: 72
End: 2022-04-07
Payer: COMMERCIAL

## 2022-06-13 ENCOUNTER — TRANSFERRED RECORDS (OUTPATIENT)
Dept: HEALTH INFORMATION MANAGEMENT | Facility: CLINIC | Age: 72
End: 2022-06-13
Payer: COMMERCIAL

## 2022-07-17 ENCOUNTER — HEALTH MAINTENANCE LETTER (OUTPATIENT)
Age: 72
End: 2022-07-17

## 2022-09-22 ENCOUNTER — TRANSFERRED RECORDS (OUTPATIENT)
Dept: HEALTH INFORMATION MANAGEMENT | Facility: CLINIC | Age: 72
End: 2022-09-22

## 2022-09-25 ENCOUNTER — HEALTH MAINTENANCE LETTER (OUTPATIENT)
Age: 72
End: 2022-09-25

## 2022-12-12 ENCOUNTER — HOSPITAL ENCOUNTER (OUTPATIENT)
Dept: MAMMOGRAPHY | Facility: CLINIC | Age: 72
Discharge: HOME | End: 2022-12-12
Attending: FAMILY MEDICINE | Admitting: FAMILY MEDICINE
Payer: COMMERCIAL

## 2022-12-12 DIAGNOSIS — Z12.31 VISIT FOR SCREENING MAMMOGRAM: ICD-10-CM

## 2022-12-12 PROCEDURE — 77067 SCR MAMMO BI INCL CAD: CPT

## 2023-02-04 ENCOUNTER — HEALTH MAINTENANCE LETTER (OUTPATIENT)
Age: 73
End: 2023-02-04

## 2023-02-21 ENCOUNTER — TRANSFERRED RECORDS (OUTPATIENT)
Dept: HEALTH INFORMATION MANAGEMENT | Facility: CLINIC | Age: 73
End: 2023-02-21

## 2023-08-05 ENCOUNTER — HEALTH MAINTENANCE LETTER (OUTPATIENT)
Age: 73
End: 2023-08-05

## 2023-10-14 ENCOUNTER — HEALTH MAINTENANCE LETTER (OUTPATIENT)
Age: 73
End: 2023-10-14

## 2023-12-11 ENCOUNTER — TELEPHONE (OUTPATIENT)
Dept: PHARMACY | Facility: CLINIC | Age: 73
End: 2023-12-11
Payer: COMMERCIAL

## 2024-02-09 ENCOUNTER — TRANSFERRED RECORDS (OUTPATIENT)
Dept: HEALTH INFORMATION MANAGEMENT | Facility: CLINIC | Age: 74
End: 2024-02-09
Payer: COMMERCIAL

## 2024-03-02 ENCOUNTER — HEALTH MAINTENANCE LETTER (OUTPATIENT)
Age: 74
End: 2024-03-02

## 2024-06-27 ENCOUNTER — TELEPHONE (OUTPATIENT)
Dept: PHARMACY | Facility: OTHER | Age: 74
End: 2024-06-27
Payer: COMMERCIAL

## 2024-06-27 NOTE — TELEPHONE ENCOUNTER
MTM Recruitment: [employer]     Referral outreach attempt #1 on June 27, 2024      Outcome: patient opted out    Vinicio Padilla PharmD  Medication Therapy Management Pharmacist  [facility]  Office phone: [phone]

## 2024-09-28 ENCOUNTER — HEALTH MAINTENANCE LETTER (OUTPATIENT)
Age: 74
End: 2024-09-28

## 2025-03-15 ENCOUNTER — HEALTH MAINTENANCE LETTER (OUTPATIENT)
Age: 75
End: 2025-03-15

## 2025-04-05 ENCOUNTER — HEALTH MAINTENANCE LETTER (OUTPATIENT)
Age: 75
End: 2025-04-05

## 2025-06-26 ENCOUNTER — APPOINTMENT (OUTPATIENT)
Dept: CT IMAGING | Facility: CLINIC | Age: 75
End: 2025-06-26
Payer: COMMERCIAL

## 2025-06-26 ENCOUNTER — HOSPITAL ENCOUNTER (EMERGENCY)
Facility: CLINIC | Age: 75
Discharge: HOME | End: 2025-06-26
Payer: COMMERCIAL

## 2025-06-26 VITALS
OXYGEN SATURATION: 98 % | SYSTOLIC BLOOD PRESSURE: 127 MMHG | RESPIRATION RATE: 18 BRPM | BODY MASS INDEX: 28.66 KG/M2 | HEIGHT: 65 IN | HEART RATE: 67 BPM | DIASTOLIC BLOOD PRESSURE: 59 MMHG | WEIGHT: 172 LBS | TEMPERATURE: 98.1 F

## 2025-06-26 DIAGNOSIS — R41.840 DIFFICULTY CONCENTRATING: ICD-10-CM

## 2025-06-26 DIAGNOSIS — N39.0 UTI (URINARY TRACT INFECTION): ICD-10-CM

## 2025-06-26 DIAGNOSIS — R41.3 MEMORY DIFFICULTIES: ICD-10-CM

## 2025-06-26 LAB
ALBUMIN UR-MCNC: NEGATIVE MG/DL
ANION GAP SERPL CALCULATED.3IONS-SCNC: 13 MMOL/L (ref 7–15)
APPEARANCE UR: CLEAR
ATRIAL RATE - MUSE: 61 BPM
BACTERIA #/AREA URNS HPF: ABNORMAL /HPF
BASOPHILS # BLD AUTO: 0.1 10E3/UL (ref 0–0.2)
BASOPHILS NFR BLD AUTO: 1 %
BILIRUB UR QL STRIP: NEGATIVE
BUN SERPL-MCNC: 31.4 MG/DL (ref 8–23)
CALCIUM SERPL-MCNC: 10 MG/DL (ref 8.8–10.4)
CHLORIDE SERPL-SCNC: 99 MMOL/L (ref 98–107)
COLOR UR AUTO: ABNORMAL
CREAT SERPL-MCNC: 1.12 MG/DL (ref 0.51–0.95)
DIASTOLIC BLOOD PRESSURE - MUSE: NORMAL MMHG
EGFRCR SERPLBLD CKD-EPI 2021: 50 ML/MIN/1.73M2
EOSINOPHIL # BLD AUTO: 0.2 10E3/UL (ref 0–0.7)
EOSINOPHIL NFR BLD AUTO: 2 %
ERYTHROCYTE [DISTWIDTH] IN BLOOD BY AUTOMATED COUNT: 12.8 % (ref 10–15)
GLUCOSE SERPL-MCNC: 125 MG/DL (ref 70–99)
GLUCOSE UR STRIP-MCNC: NEGATIVE MG/DL
HCO3 SERPL-SCNC: 27 MMOL/L (ref 22–29)
HCT VFR BLD AUTO: 38.4 % (ref 35–47)
HGB BLD-MCNC: 12.8 G/DL (ref 11.7–15.7)
HGB UR QL STRIP: NEGATIVE
HOLD SPECIMEN: NORMAL
IMM GRANULOCYTES # BLD: 0 10E3/UL
IMM GRANULOCYTES NFR BLD: 0 %
INTERPRETATION ECG - MUSE: NORMAL
KETONES UR STRIP-MCNC: NEGATIVE MG/DL
LEUKOCYTE ESTERASE UR QL STRIP: ABNORMAL
LYMPHOCYTES # BLD AUTO: 2.7 10E3/UL (ref 0.8–5.3)
LYMPHOCYTES NFR BLD AUTO: 40 %
MAGNESIUM SERPL-MCNC: 1.7 MG/DL (ref 1.7–2.3)
MCH RBC QN AUTO: 32.2 PG (ref 26.5–33)
MCHC RBC AUTO-ENTMCNC: 33.3 G/DL (ref 31.5–36.5)
MCV RBC AUTO: 97 FL (ref 78–100)
MONOCYTES # BLD AUTO: 0.6 10E3/UL (ref 0–1.3)
MONOCYTES NFR BLD AUTO: 9 %
MUCOUS THREADS #/AREA URNS LPF: PRESENT /LPF
NEUTROPHILS # BLD AUTO: 3.2 10E3/UL (ref 1.6–8.3)
NEUTROPHILS NFR BLD AUTO: 47 %
NITRATE UR QL: POSITIVE
NRBC # BLD AUTO: 0 10E3/UL
NRBC BLD AUTO-RTO: 0 /100
P AXIS - MUSE: 49 DEGREES
PH UR STRIP: 5.5 [PH] (ref 5–7)
PLATELET # BLD AUTO: 273 10E3/UL (ref 150–450)
POTASSIUM SERPL-SCNC: 4.4 MMOL/L (ref 3.4–5.3)
PR INTERVAL - MUSE: 136 MS
QRS DURATION - MUSE: 72 MS
QT - MUSE: 428 MS
QTC - MUSE: 430 MS
R AXIS - MUSE: 39 DEGREES
RBC # BLD AUTO: 3.98 10E6/UL (ref 3.8–5.2)
RBC URINE: <1 /HPF
SODIUM SERPL-SCNC: 139 MMOL/L (ref 135–145)
SP GR UR STRIP: 1.02 (ref 1–1.03)
SQUAMOUS EPITHELIAL: <1 /HPF
SYSTOLIC BLOOD PRESSURE - MUSE: NORMAL MMHG
T AXIS - MUSE: 50 DEGREES
TROPONIN T SERPL HS-MCNC: 12 NG/L
TROPONIN T SERPL HS-MCNC: 13 NG/L
TSH SERPL DL<=0.005 MIU/L-ACNC: 1.45 UIU/ML (ref 0.3–4.2)
UROBILINOGEN UR STRIP-MCNC: NORMAL MG/DL
VENTRICULAR RATE- MUSE: 61 BPM
WBC # BLD AUTO: 6.8 10E3/UL (ref 4–11)
WBC URINE: 2 /HPF

## 2025-06-26 PROCEDURE — 83735 ASSAY OF MAGNESIUM: CPT

## 2025-06-26 PROCEDURE — 80048 BASIC METABOLIC PNL TOTAL CA: CPT

## 2025-06-26 PROCEDURE — 87186 SC STD MICRODIL/AGAR DIL: CPT

## 2025-06-26 PROCEDURE — 85025 COMPLETE CBC W/AUTO DIFF WBC: CPT

## 2025-06-26 PROCEDURE — 250N000011 HC RX IP 250 OP 636

## 2025-06-26 PROCEDURE — 93005 ELECTROCARDIOGRAM TRACING: CPT

## 2025-06-26 PROCEDURE — 70496 CT ANGIOGRAPHY HEAD: CPT

## 2025-06-26 PROCEDURE — 72125 CT NECK SPINE W/O DYE: CPT

## 2025-06-26 PROCEDURE — 36415 COLL VENOUS BLD VENIPUNCTURE: CPT

## 2025-06-26 PROCEDURE — 81001 URINALYSIS AUTO W/SCOPE: CPT

## 2025-06-26 PROCEDURE — 99285 EMERGENCY DEPT VISIT HI MDM: CPT | Mod: 25

## 2025-06-26 PROCEDURE — 250N000013 HC RX MED GY IP 250 OP 250 PS 637

## 2025-06-26 PROCEDURE — 84484 ASSAY OF TROPONIN QUANT: CPT

## 2025-06-26 PROCEDURE — 84443 ASSAY THYROID STIM HORMONE: CPT

## 2025-06-26 RX ORDER — IOPAMIDOL 755 MG/ML
75 INJECTION, SOLUTION INTRAVASCULAR ONCE
Status: COMPLETED | OUTPATIENT
Start: 2025-06-26 | End: 2025-06-26

## 2025-06-26 RX ORDER — SULFAMETHOXAZOLE AND TRIMETHOPRIM 800; 160 MG/1; MG/1
1 TABLET ORAL ONCE
Status: COMPLETED | OUTPATIENT
Start: 2025-06-26 | End: 2025-06-26

## 2025-06-26 RX ORDER — SULFAMETHOXAZOLE AND TRIMETHOPRIM 800; 160 MG/1; MG/1
1 TABLET ORAL 2 TIMES DAILY
Qty: 14 TABLET | Refills: 0 | Status: SHIPPED | OUTPATIENT
Start: 2025-06-26 | End: 2025-07-03

## 2025-06-26 RX ADMIN — IOPAMIDOL 75 ML: 755 INJECTION, SOLUTION INTRAVENOUS at 19:19

## 2025-06-26 RX ADMIN — SULFAMETHOXAZOLE AND TRIMETHOPRIM 1 TABLET: 800; 160 TABLET ORAL at 20:52

## 2025-06-26 ASSESSMENT — COLUMBIA-SUICIDE SEVERITY RATING SCALE - C-SSRS
2. HAVE YOU ACTUALLY HAD ANY THOUGHTS OF KILLING YOURSELF IN THE PAST MONTH?: NO
1. IN THE PAST MONTH, HAVE YOU WISHED YOU WERE DEAD OR WISHED YOU COULD GO TO SLEEP AND NOT WAKE UP?: NO
6. HAVE YOU EVER DONE ANYTHING, STARTED TO DO ANYTHING, OR PREPARED TO DO ANYTHING TO END YOUR LIFE?: NO

## 2025-06-26 ASSESSMENT — ACTIVITIES OF DAILY LIVING (ADL)
ADLS_ACUITY_SCORE: 41
ADLS_ACUITY_SCORE: 41

## 2025-06-26 NOTE — ED PROVIDER NOTES
"Emergency Department Encounter   NAME: Yumiko Peña  AGE: 75 year old female   YOB: 1950 ;   MRN: [id number] ;    ED PROVIDER: Silva Dial PA-C    PCP: Dayami Robertson    Evaluation Date & Time:   6/26/25 6618    CHIEF COMPLAINT:  Shoulder Pain and Altered Mental Status      FINAL IMPRESSION:    ICD-10-CM    1. UTI (urinary tract infection)  N39.0       2. Difficulty concentrating  R41.840       3. Memory difficulties  R41.3             IMPRESSION AND PLAN   MDM: Yumiko Peña is a 75 year old female with a pertinent history of DM2, HTN, metabolic syndrome, HLD, hemochromatosis, CAD, cervical radiculopathy, RLS who presents to the ED by walk-in for evaluation of difficulty concentrating, memory difficulties and \"just not feeling like myself\".  Patient reports the symptoms have been going on for the last month or so.  She called nurse triage who recommended she be further evaluated in the emergency department.    Vitals - hypertensive otherwise vitally stable. On exam patient is well-appearing in no acute distress.  She is alert and oriented x 3.  She is completely neurally intact.  Strength sensation bilateral upper and lower extremities intact.  Abdomen is soft and diffusely nontender.  Patient also endorses some increased urinary frequency and does have a history of frequent UTIs but denies any dysuria or hematuria.  Patient denies any recent head injuries or falls.  She does endorse some personal life stressors over the last several years and is currently taking citalopram for depression.  She does endorse feeling down and sad but denies any suicidal ideation.  Differentials include UTI, vascular etiology, cardiac arrhythmia, ACS, thyroid disorder, electrolyte abnormality, dehydration, aging, anemia, hyper versus hypoglycemia.  Low suspicion for dissection, no abdominal pain to suggest intra-abdominal pathology.  She is vitally stable, no evidence of sepsis.  Given the symptoms have " been going on for the last month and patient is neurally intact here, do not suspect TIA or CVA.    Blood work reviewed and reveals creatinine is mildly elevated at 1.12, otherwise no emergent electrolyte abnormality.  Initial troponin of 13, will trend.  ECG reveals sinus rhythm, no evidence of acute STEMI or arrhythmias.  Delta troponin 12, do not suspect ACS.  TSH WNL.  UA does reveal positive leukocytes and nitrites concerning for UTI.  It is unclear whether or not this is causing or contributing to her symptoms but we will start her on antibiotics for UTI.  CTA head and neck grossly unremarkable, no evidence of arterial aneurysms, stenosis or hemorrhage.  CT head without any masses or lesions.  CT cervical spine again very reassuring.      Upon reevaluation, patient is resting comfortably in her hospital bed, she is now normotensive.  I discussed her overall very reassuring workup and informed her of her UA results.  Based on chart review, patient was last treated for UTI in January 2025 with Bactrim. Past cultures have grown E. Coli and sometimes klebsiella. Patient reports that this antibiotic worked very well for her, so we will start her on this antibiotic again today.  First dose given here in the emergency department.  I encouraged her to follow-up with her primary care provider after the antibiotic course to reevaluate her symptoms and to see if there has been any improvement.  I discussed tricked return precautions which she expressed understanding. All questions and concerns addressed. Patient is overall agreeable to this plan and feels comfortable with discharge at this time.      MIPS (CTPE, Dental pain, Tripathi, Sinusitis, Asthma/COPD, Head Trauma): Not Applicable    SEPSIS: None      ED COURSE:  5:03 PM I met and introduced myself to the patient. I gathered initial history and performed my physical exam. We discussed plan for initial workup.    8:28 PM I rechecked the patient and discussed results,  discharge, follow up, and reasons to return to the ED.           MEDICATIONS GIVEN IN THE EMERGENCY DEPARTMENT:  Medications   iopamidol (ISOVUE-370) solution 75 mL (75 mLs Intravenous $Given 25)   sulfamethoxazole-trimethoprim (BACTRIM DS) 800-160 MG per tablet 1 tablet (1 tablet Oral $Given 25)         NEW PRESCRIPTIONS STARTED AT TODAY'S ED VISIT:  Discharge Medication List as of 2025  8:55 PM        START taking these medications    Details   sulfamethoxazole-trimethoprim (BACTRIM DS) 800-160 MG tablet Take 1 tablet by mouth 2 times daily for 7 days., Disp-14 tablet, R-0, E-Prescribe               BRIEF HPI   Patient information was obtained from: patient    Use of Intrepreter: N/A     Yumiko Peña is a 75 year old female with a pertinent history of diabetes mellitus type 2, SIM, hypercholesterolemia, hypertension, who presents to the ED by walk in for evaluation of altered mental status and shoulder pain.    Patient endorses concentration and memory changes along with muscular shoulder pain in both shoulders that radiating up to her neck and head onset 6 weeks ago.  States she feels like her blood pressure is higher then normal. Suffers from more pan when doing overhead actives like brushing her hair, showering etc. States she is feeling more volnerable in activities like driving; recently got a new car. She lives alone with her dog, her   3 years ago, in the past 3 years she has sold multiple things like old home and car. States she's not feeling stressed or under pressure. Endorses feeling sad and down. Her family members don't want her driving to her cabin solo anymore, states they are noticing mental changes as well. Denies any recent falls, injury, recent car accidents, fever, nausea, vomiting. States that she had some urinary frequency with chills, used previous  estradiol which seemed to clear symptoms, denies burning or blood in urine. Mentions she had recent  "medication change in April (2025), discontinued potassium chloride and cut water pill in half.       REVIEW OF SYSTEMS:  Pertinent positive and negative symptoms per HPI.       MEDICAL HISTORY     Past Medical History:   Diagnosis Date    DM type 2 (diabetes mellitus, type 2) (H)     Hemochromatosis     Hyperlipidemia     Hypertension        Past Surgical History:   Procedure Laterality Date    ARTHROSCOPY KNEE  2000    CATARACT EXTRACTION Right 2019    OTHER SURGICAL HISTORY      vocal cord polyp    TUBAL LIGATION         Family History   Problem Relation Age of Onset    Diabetes Mother     Hyperlipidemia Mother     Cancer Father         abdominal    Hypertension Sister     Hyperlipidemia Sister     Hemochromatosis Other     Breast Cancer Maternal Aunt     Breast Cancer Cousin        Social History     Tobacco Use    Smoking status: Former     Current packs/day: 0.00     Types: Cigarettes     Quit date: 1995     Years since quittin.5    Smokeless tobacco: Never   Substance Use Topics    Alcohol use: No    Drug use: No         PHYSICAL EXAM     First Vitals:  Patient Vitals for the past 24 hrs:   BP Temp Temp src Pulse Resp SpO2 Height Weight   25 127/59 -- -- 67 -- 98 % -- --   25 1930 137/62 -- -- -- -- -- -- --   25 1653 (!) 155/69 98.1  F (36.7  C) Temporal 66 18 99 % 1.638 m (5' 4.5\") 78 kg (172 lb)       PHYSICAL EXAM:  Physical Exam  Vitals and nursing note reviewed.   Constitutional:       General: She is not in acute distress.     Appearance: Normal appearance. She is well-developed and normal weight. She is not ill-appearing or diaphoretic.   HENT:      Head: Normocephalic and atraumatic.      Nose: Nose normal.      Mouth/Throat:      Mouth: Mucous membranes are moist.   Eyes:      Extraocular Movements: Extraocular movements intact.      Right eye: Normal extraocular motion.      Left eye: Normal extraocular motion.      Conjunctiva/sclera: Conjunctivae normal.     "  Pupils: Pupils are equal, round, and reactive to light. Pupils are equal.   Cardiovascular:      Rate and Rhythm: Normal rate and regular rhythm.      Heart sounds: Normal heart sounds.   Pulmonary:      Effort: Pulmonary effort is normal. No respiratory distress.      Breath sounds: Normal breath sounds. No wheezing or rhonchi.   Abdominal:      General: Abdomen is flat. There is no distension.      Palpations: Abdomen is soft.      Tenderness: There is no abdominal tenderness. There is no guarding.   Musculoskeletal:      Cervical back: Normal range of motion and neck supple. Muscular tenderness present. No pain with movement or spinous process tenderness. Normal range of motion.   Lymphadenopathy:      Cervical: No cervical adenopathy.   Skin:     General: Skin is warm and dry.   Neurological:      Mental Status: She is alert and oriented to person, place, and time.      GCS: GCS eye subscore is 4. GCS verbal subscore is 5. GCS motor subscore is 6.      Cranial Nerves: No cranial nerve deficit, dysarthria or facial asymmetry.      Sensory: No sensory deficit.      Motor: No weakness.      Gait: Gait normal.   Psychiatric:         Mood and Affect: Mood normal.         Speech: Speech normal.          RESULTS     LAB:  All pertinent labs reviewed and interpreted  Labs Ordered and Resulted from Time of ED Arrival to Time of ED Departure   BASIC METABOLIC PANEL - Abnormal       Result Value    Sodium 139      Potassium 4.4      Chloride 99      Carbon Dioxide (CO2) 27      Anion Gap 13      Urea Nitrogen 31.4 (*)     Creatinine 1.12 (*)     GFR Estimate 50 (*)     Calcium 10.0      Glucose 125 (*)    ROUTINE UA WITH MICROSCOPIC REFLEX TO CULTURE - Abnormal    Color Urine Light Yellow      Appearance Urine Clear      Glucose Urine Negative      Bilirubin Urine Negative      Ketones Urine Negative      Specific Gravity Urine 1.022      Blood Urine Negative      pH Urine 5.5      Protein Albumin Urine Negative       Urobilinogen Urine Normal      Nitrite Urine Positive (*)     Leukocyte Esterase Urine 25 Stevie/uL (*)     Bacteria Urine Few (*)     Mucus Urine Present (*)     RBC Urine <1      WBC Urine 2      Squamous Epithelials Urine <1     TROPONIN T, HIGH SENSITIVITY - Normal    Troponin T, High Sensitivity 13     MAGNESIUM - Normal    Magnesium 1.7     TSH WITH FREE T4 REFLEX - Normal    TSH 1.45     TROPONIN T, HIGH SENSITIVITY - Normal    Troponin T, High Sensitivity 12     CBC WITH PLATELETS AND DIFFERENTIAL    WBC Count 6.8      RBC Count 3.98      Hemoglobin 12.8      Hematocrit 38.4      MCV 97      MCH 32.2      MCHC 33.3      RDW 12.8      Platelet Count 273      % Neutrophils 47      % Lymphocytes 40      % Monocytes 9      % Eosinophils 2      % Basophils 1      % Immature Granulocytes 0      NRBCs per 100 WBC 0      Absolute Neutrophils 3.2      Absolute Lymphocytes 2.7      Absolute Monocytes 0.6      Absolute Eosinophils 0.2      Absolute Basophils 0.1      Absolute Immature Granulocytes 0.0      Absolute NRBCs 0.0     URINE CULTURE       RADIOLOGY:  CT Cervical Spine w/o Contrast   Final Result   IMPRESSION:   HEAD CT:   1.  No acute intracranial abnormality.   2.  Unchanged mild chronic age-related findings, as described.      CERVICAL SPINE CT:   1.  No CT evidence for acute fracture or post traumatic subluxation.   2.  Diffuse osseous demineralization and advanced multilevel spondylosis, as described. No high-grade spinal canal stenosis.       HEAD CTA:    1.  No large vessel occlusion, high-grade stenosis, aneurysm, or high-flow vascular malformation.      NECK CTA:   1.  No hemodynamically significant stenosis or dissection in the neck vessels.      CTA Head Neck with Contrast   Final Result   IMPRESSION:   HEAD CT:   1.  No acute intracranial abnormality.   2.  Unchanged mild chronic age-related findings, as described.      CERVICAL SPINE CT:   1.  No CT evidence for acute fracture or post traumatic  subluxation.   2.  Diffuse osseous demineralization and advanced multilevel spondylosis, as described. No high-grade spinal canal stenosis.       HEAD CTA:    1.  No large vessel occlusion, high-grade stenosis, aneurysm, or high-flow vascular malformation.      NECK CTA:   1.  No hemodynamically significant stenosis or dissection in the neck vessels.          ECG:  Performed at: 1749 on 6/26/25    Impression: Sinus rhythm, low voltage QRS    Rate: 61 bpm  Rhythm: Sinus  Axis: Normal  AK Interval: 136 ms  QRS Interval: 72 ms  QTc Interval: 430 ms  ST Changes: None  Comparison: No significant changes when compared with ECG from 3/9/25.    EKG results reviewed and interpreted by Dr. Aureliano ED MD and Silva Dial PA-C.           Sanjuanita ZAVALA , am serving as a scribe to document services personally performed by Silva Dial PA-C, based on my observation and the provider's statements to me. Silva ZAVALA PA-C attest that Sanjuanita Coyne  is acting in a scribe capacity, has observed my performance of the services and has documented them in accordance with my direction.       Silva Dial PA-C  Emergency Medicine   [facility] EMERGENCY ROOM       Silva Dial PA-C  06/26/25 7907

## 2025-06-26 NOTE — ED TRIAGE NOTES
Pt reports trouble focusing and difficulty concentrating and grabbing the wrong item for past 6 weeks. Also with bilteral shoulder, neck, head and face pressure/pain recently. Unsure for how long but more than a week. No falls or injury. Med change in April. Pt alert.     Triage Assessment (Adult)       Row Name 06/26/25 1368          Triage Assessment    Airway WDL WDL        Respiratory WDL    Respiratory WDL WDL        Skin Circulation/Temperature WDL    Skin Circulation/Temperature WDL WDL        Cardiac WDL    Cardiac WDL WDL        Peripheral/Neurovascular WDL    Peripheral Neurovascular WDL WDL        Cognitive/Neuro/Behavioral WDL    Cognitive/Neuro/Behavioral WDL X     Level of Consciousness other (see comments)  reports difficulty with concentrating in past 6 weeks and grabbing wrong item     Arousal Level opens eyes spontaneously     Orientation oriented x 4     Speech clear;spontaneous;logical     Mood/Behavior calm;cooperative        [place] Coma Scale    Best Eye Response 4-->(E4) spontaneous     Best Motor Response 6-->(M6) obeys commands     Best Verbal Response 5-->(V5) oriented     [place] Coma Scale Score 15

## 2025-06-27 NOTE — DISCHARGE INSTRUCTIONS
Your workup showed a UTI but was otherwise unremarkable. I recommend you see your PCP for reevaluation after the antibiotic course has been completed. Please return to the ED for any new or worsening symptoms.

## 2025-06-28 ENCOUNTER — TELEPHONE (OUTPATIENT)
Dept: NURSING | Facility: CLINIC | Age: 75
End: 2025-06-28
Payer: COMMERCIAL

## 2025-06-28 LAB — BACTERIA UR CULT: ABNORMAL

## 2025-06-28 NOTE — TELEPHONE ENCOUNTER
[facility]     Reason for call: Lab Result Notification     Lab Result (including Rx patient on, if applicable).  If culture, copy of lab report at bottom.  Lab Result: Urine Culture  6/26/25 Prescribed Sulfamethoxazole-trimethoprim (BACTRIM DS) 800-160 MG tablet Take 1 tablet by mouth 2 times daily for 7 days. - Oral (SUSCEPTIBLE)    Creatinine Level (mg/dl)   Creatinine   Date Value Ref Range Status   06/26/2025 1.12 (H) 0.51 - 0.95 mg/dL Final    Creatinine clearance (ml/min), if applicable    Serum creatinine: 1.12 mg/dL (H) 06/26/25 1803  Estimated creatinine clearance: 43 mL/min (A)     RN Recommendations/Instructions per [facility] ED lab result protocol:   [facility] ED lab result protocol utilized: Urine Culture    6/26/25 Presented to ED with symptoms: difficulty concentrating and not feeling herself    Patient's current Symptoms at time of telephone encounter:   Pt states she does not have much of an appetite and queezy this morning.  Pt states she feels tired.    Patient/care giver notified to contact your PCP clinic or return to the Emergency department if your:  Symptoms return.  Symptoms do not improve after 3 days on antibiotic.  Symptoms do not resolve after completing antibiotic.  Symptoms worsen or other concerning symptoms.       Genie Hensley RN